# Patient Record
Sex: MALE | Race: BLACK OR AFRICAN AMERICAN | NOT HISPANIC OR LATINO | Employment: OTHER | URBAN - METROPOLITAN AREA
[De-identification: names, ages, dates, MRNs, and addresses within clinical notes are randomized per-mention and may not be internally consistent; named-entity substitution may affect disease eponyms.]

---

## 2017-02-22 ENCOUNTER — TELEPHONE (OUTPATIENT)
Dept: CRITICAL CARE MEDICINE | Facility: HOSPITAL | Age: 62
End: 2017-02-22

## 2017-02-22 NOTE — TELEPHONE ENCOUNTER
Mr. Al notes chest pressure for a few weeks, coming and going. More when lying down, positional. Does not radiate to arm or neck.  More short of breath when lying back in power chair, and with conversation.  No fever or chills.    Assessment:  Progressive orthopnea in the setting of ALS, Mr Al has chronic neuromuscular respiratory failure.  He will benefit from NIV and we will order a trilogy ventilator to supply advanced primary and secondary settings, battery backup and alarms.  BIPAP will not be sufficient respiratory support.  I have advised his daughter Nevaeh to resume cough assist 1-2 times per day and to obtain a pulse oximeter. If chest pressure or shortness of breath worsen acutely, I advised them to go to the local ED.    Bharat Gomez MD

## 2017-02-23 DIAGNOSIS — G12.21 ALS (AMYOTROPHIC LATERAL SCLEROSIS): Primary | ICD-10-CM

## 2017-03-01 ENCOUNTER — OFFICE VISIT (OUTPATIENT)
Dept: NEUROLOGY | Facility: CLINIC | Age: 62
End: 2017-03-01
Payer: MEDICARE

## 2017-03-01 VITALS
HEIGHT: 69 IN | DIASTOLIC BLOOD PRESSURE: 117 MMHG | HEART RATE: 101 BPM | WEIGHT: 154.13 LBS | SYSTOLIC BLOOD PRESSURE: 171 MMHG | BODY MASS INDEX: 22.83 KG/M2

## 2017-03-01 DIAGNOSIS — Z74.09 IMPAIRED MOBILITY AND ADLS: ICD-10-CM

## 2017-03-01 DIAGNOSIS — Z78.9 IMPAIRED MOBILITY AND ADLS: ICD-10-CM

## 2017-03-01 DIAGNOSIS — G89.29 CHRONIC LEFT SHOULDER PAIN: ICD-10-CM

## 2017-03-01 DIAGNOSIS — Z00.8 NUTRITIONAL ASSESSMENT: ICD-10-CM

## 2017-03-01 DIAGNOSIS — R29.898 WEAKNESS OF BOTH UPPER EXTREMITIES: ICD-10-CM

## 2017-03-01 DIAGNOSIS — G12.21 ALS (AMYOTROPHIC LATERAL SCLEROSIS): Primary | ICD-10-CM

## 2017-03-01 DIAGNOSIS — M25.512 CHRONIC LEFT SHOULDER PAIN: ICD-10-CM

## 2017-03-01 PROCEDURE — G8996 SWALLOW CURRENT STATUS: HCPCS | Mod: CH,PO

## 2017-03-01 PROCEDURE — G8999 MOTOR SPEECH CURRENT STATUS: HCPCS | Mod: CI,PO

## 2017-03-01 PROCEDURE — G8980 MOBILITY D/C STATUS: HCPCS | Mod: CL,PO

## 2017-03-01 PROCEDURE — G8988 SELF CARE GOAL STATUS: HCPCS | Mod: CN,PO

## 2017-03-01 PROCEDURE — 92522 EVALUATE SPEECH PRODUCTION: CPT | Mod: PO

## 2017-03-01 PROCEDURE — G9186 MOTOR SPEECH GOAL STATUS: HCPCS | Mod: CI,PO

## 2017-03-01 PROCEDURE — G8997 SWALLOW GOAL STATUS: HCPCS | Mod: CH,PO

## 2017-03-01 PROCEDURE — 92610 EVALUATE SWALLOWING FUNCTION: CPT | Mod: PO

## 2017-03-01 PROCEDURE — 99213 OFFICE O/P EST LOW 20 MIN: CPT

## 2017-03-01 PROCEDURE — G8989 SELF CARE D/C STATUS: HCPCS | Mod: CN,PO

## 2017-03-01 PROCEDURE — G8987 SELF CARE CURRENT STATUS: HCPCS | Mod: CN,PO

## 2017-03-01 PROCEDURE — 97164 PT RE-EVAL EST PLAN CARE: CPT | Mod: PO

## 2017-03-01 PROCEDURE — G8979 MOBILITY GOAL STATUS: HCPCS | Mod: CL,PO

## 2017-03-01 PROCEDURE — G8998 SWALLOW D/C STATUS: HCPCS | Mod: CH,PO

## 2017-03-01 PROCEDURE — G9158 MOTOR SPEECH D/C STATUS: HCPCS | Mod: CI,PO

## 2017-03-01 PROCEDURE — G8978 MOBILITY CURRENT STATUS: HCPCS | Mod: CL,PO

## 2017-03-01 PROCEDURE — 99215 OFFICE O/P EST HI 40 MIN: CPT | Mod: S$PBB,,, | Performed by: PSYCHIATRY & NEUROLOGY

## 2017-03-01 PROCEDURE — 97112 NEUROMUSCULAR REEDUCATION: CPT | Mod: PO

## 2017-03-01 RX ORDER — TRAMADOL HYDROCHLORIDE 50 MG/1
50 TABLET ORAL NIGHTLY
Qty: 30 TABLET | Refills: 0 | Status: SHIPPED | OUTPATIENT
Start: 2017-03-01 | End: 2017-03-11

## 2017-03-01 NOTE — PROGRESS NOTES
"Date: 03/01/2017  Start Time:  9:02 am  Stop Time:  9:23 am    ALS Clinic: 3    OUTPATIENT NEUROLOGICAL REHABILITATION  SPEECH THERAPY EVALUATION    HISTORY AND SUBJECTIVE    Onset Date:  2012  Primary Diagnosis:  ALS  Treatment Diagnosis:  Mild dysarthria and mild dysphagia   Past Medical History:   Past Medical History:   Diagnosis Date    Coronary artery disease     Hypertension      History of Present Illness:  Mr. Al presents to the [facility] clinic secondary to the diagnosis of ALS.   Functional Deficits Leading to Referral/Nature of Injury: Progressive muscle weakness   Precautions:  General swallowing  Prior Therapy:  No speech therapy  Pain:  None reported  Nutrition:  Regular consistency diet and thin liquids  Environmental Concerns/Cultural/Spiritual/Developmental/Educational Needs: None  Social History:  Mr. Al lives in [place], [place] with a friend who is his primary care giver. He is a retired . Mr. Al came to clinic today with his daughter, who lives in [place]. He also has one other adult daughter. He has 24 hour supervision.  Chief Complaint: no speech or swallowing needs at this time; "The disease takes a lot out of you. I don't want to prolong it."     OBJECTIVE  Current Assessment:   Auditory Comprehension: Did not formally assess, no concerns reported or observed.    Reading Comprehension: Did not formally assess today, no concerns were reported.     Verbal Expression: Appeared to be within functional limits in conversational speech during the evaluation.     Written Expression: Did not formally assess. Mr. Al no longer has functional use of his hands, which inhibits his ability to write.     Cognition: Did not formally assess. No concerns were reported.     Motor Speech/Fluency/Voice: Oral mechanism exam revealed: decreased labial muscle strength, decreased lingual muscle strength and range of motion.Lingual tremors were noted. " "Velar elevation was slightly reduced for sustained and alternating elevation. Buccal muscle strength was reduced. Oral motor skills were within functional limits. Mr. Al had full upper and lower dentures. Diadochokinetic (DDK) rates for rapid repetition of 1 - 3 syllable utterances were slowed and mildly imprecise. He reported that he feels like he is running out of air when he speaks. Vocal intensity was reduced as well as labored breathing and speaking. Overall speech intelligibility was good in all contexts most of the time.    Augmentative/Alternative Communication: Not needed at this time. Mr. Al declined banking his voice.     Swallowing: No concerns reported with swallowing when he is sitting upright. Mr. Al reported that he has trouble swallowing when he is reclined. He was advised only to eat and drink when in an upright position. He reported that he does not have difficulty swallowing his medications and that he takes them one at a time. Clinical Swallow Study: Mr. Al was given cracker, water, and pudding. Oral Phase: no anterior loss, good mastication, adequate lip seal around straw and spoon, slight residue after cracker requiring a liquid wash, adequate transfer of bolus. Pharyngeal Phase: sluggish and reduced laryngeal elevation via palpation, audible swallows and delayed cough after straw sips, multiple swallows after josi cracker. After taking 3 sips from a straw, laryngeal elevation was noted only after 2/3 sips. It was recommended to him to have someone pinch the straw while he drinks to calibrate his sips. Vocal quality remained clear. He reported an overall decrease in appetite recently. He does not want a PEG tube. When speaking of the PEG tube, he became emotional and stated "This disease takes a lot out of you, you know?" and "I don't want to be a burden to my family."    Hearing: Appeared to be within functional limits during the evaluation.    Education: Mr. Al and his " daughter were educated on swallowing precautions, compensatory strategies, and the plan of care. He was educated on voice banking, but refused. They verbalized understanding and agreed with the plan of care.    ASSESSMENT  Impressions:  Mr. Al presented with a mild dysarthria and mild dysphagia as a result of his diagnosis of ALS. His deficits are characterized by mild oral motor weakness. However, oral motor skills are functional at this time. Speech was intelligible in all contexts. Mr. Al exhibited mild dysphagia c/b slow laryngeal elevation and audible and inconsistent swallows. A calibrated straw or pinching a straw was recommended to control sip sizes. Cognitive-linguistic, voice, and fluency skills appeared to be within functional limits during the evaluation. During the evaluation he became emotional when asked about a PEG tube. He reported that he does not want to be a burden to his family. Outpatient neuro rehab speech therapy is not warranted at this time.     Functional Communication Measure (FCM):   Severity Modifier for Medicare G-Code:  Motor Speech  Current status: FCM:  Level 6   - CI at least 1% but less than 20% impaired, limited or restricted   Projected status:  FCM:  Level 6    -  CI at least 1% but less than 20% impaired, limited or restricted   Discharge status:  FCM:  Level 6   -  CI at least 1% but less than 20% impaired, limited or restricted     Swallowing  Current status:  FCM:  Level 7   -  CH 0% impaired, limited or restricted  Projected status:  FCM:  Level  7  -  CH 0% impaired, limited or restricted  Discharge status:  FCM:   Level  7  -   CH 0% impaired, limited or restricted    PLAN  Recommended Treatment Plan: Mr. Al will follow up with the [facility] in three months.    ALFREDO King   Clinician     I certify that I was present in the room directing the student in service delivery and guiding them using my  skilled judgment. As the co-signing therapist I have reviewed the students documentation and am responsible for the treatment, assessment, and plan.    GERRY Chambers, CCC-SLP  Speech-Language Pathologist  Outpatient Neurological Rehabilitation      Date: 03/01/2017

## 2017-03-01 NOTE — PROGRESS NOTES
MNT follow-up:    ALS Clinic # 4  Pt & his daughter seen today in conjunction with SLP.    Pt reports his appetite is diminished; is eating 3 times/day, small amounts.  Regular, soft as tolerated diet.  Prefers water to drink; also drinks lemonade and some juice.  Drinks one Ensure Plus daily.  Last night for dinner had broccoli & cheese rice casserole with 1 fried chicken wing.  Water.  Unable to feed himself; requires full assist.  Pt's daughter seems very supportive.    Became tearful when asked about consideration of feeding tube placement, and was adamant he does not want a PEG.     Today's wt of 69.9 kg includes pt + WC; pt's actual weight unable to be determined.  However, this is pt's 4th ALS clinic, and he appears to have lost weight since previous visits.    Will continue to follow according to ALS protocol

## 2017-03-01 NOTE — PROGRESS NOTES
OUTPATIENT NEUROLOGICAL REHABILITATION  PHYSICAL THERAPY EVALUATION    Name: Davey Al  Clinic Number: [id number]    Diagnosis:   Encounter Diagnoses   Name Primary?    Impaired mobility and ADLs Yes    Weakness of both upper extremities     Chronic left shoulder pain     ALS (amyotrophic lateral sclerosis)      Physician: Dr. Joseph  Treatment Orders: PT Eval at ALS clinic  Past Medical History:   Diagnosis Date    Coronary artery disease     Hypertension        Clinic Evaluation Date: 03/01/2017  ALS clinic visit #: 3  Plan of care expiration: Evaluation only.   Precautions: fall risk    Time in: 10:50 AM  Time out: 11:10 AM    History & Subjective     Medical Diagnosis: ALS   PT Diagnosis: UE weakness, impaired ADLs  Chief complaint: no UE function, fast progression of LE atrophy  History of Present Illness: Davey is a 62 y.o. male that presents to [facility] secondary to dx of ALS. Pt was first diagnosed 5 years ago.  Pt is still ambulatory for short household distances with SBA. Pt requires assist with all mobility due to weakness and fall risk.    Current Functional Deficits/Chief Complaint:   1. Difficulty with coughing and increased breathing difficulty   2. B UE weakness  3. increased LE weakness and trunk weakness  4. Pain and discomfort in WC and in bed    Prior Therapy: Outpatient over a year ago - poor results, was not happy with therapy - did not feel the therapists knew about ALS.   Social History: Lives with friend.   Place of Residence (Steps/Adaptations/Levels): 2 steps to enter  Home adaptations: ramp  DME owned: power WC for in home, borrowed manual w/c, adjustable bed, BSC, tub bench  Exercise routine: caregiver PROM by aide  Work/Job description includes:  n/a      Subjective   Pt stated goals/concerns: Discomfort at toes in the bed, increasing difficulty with all mobility.    Family present/states: Adult daughter, Nevaeh present.  Pain: L>R big toe when in  the bed with sheets over 7-8/10 - has found loosening the sheets at the foot of the bed helps.  Sore around chest and back from labored breathing.   Falls: denies falls since last visit.    Objective     Mental status: alert, oriented to person, place, and time  Appearance: Casually dressed  Behavior:  cooperative, tearful at times, depressed   Attention Span and Concentration:  Limited (daughter with good attention)    Posture Alignment: increased ridigity noted in thoracic/ lumbar spine, cervical flexion/ forward head      Sensation: Light Touch: Intact        Proprioception:   Intact    Tone: decreased tone B UEs, trace strength bilaterally; decreased B LEs and trunk    Coordination:   - fine motor: unable  - UE coordination: unable   - LE coordination:  impaired    Lower Extremity ROM WNL for all.     Lower Extremity Strength:   Hip flex R 2/5, trace L  Knee extension: bilaterally 2/5  Knee flex: bilaterally 3/5  DF: right= 1/5, left= 0/5     Evaluation 11/2/16 3/1/17   5 times sit-stand 13 seconds, no hands Unable without assist Unable N/A     Gait Assessment:  No longer ambulatory.     Sitting balance static: fair  Sitting balance dynamic: fair-/poor+  Standing balance static: unable  Standing balance dynamic:  unable    Endurance Deficit: Very limited due to respiratory impairments.     Functional Mobility (Bed mobility, transfers)  Bed mobility: modA  Supine to sit: modA  Sit to supine: modA  Transfers to bed: modA - stand pivot only  Transfers to toilet: modA - stand pivot only  Sit to stand:  modA  Stand pivot:  ModA  Car transfers: ModA  Wheelchair mobility: Louis with power WC - reports some difficulty with chin control controlling the WC ascending ramp at home. No other drive issues.     Education provided re: PROM, pressure relief needs.   Pt's daughter was receptive but pt was minimally receptive to education due to mood/depression.   Pt has no cultural, educational or language barriers to learning  provided.      Assessment   This is a 62 y.o. male referred to outpatient physical therapy and presents with a medical diagnosis of ALS and demonstrates limitations as described in the problem list. Mr. Al has had continued progression of his weakness affecting his trunk and LE stability. He is no longer able to walk even in the home and is WC dependent. He is now requiring more assist from the aides and family members for all transfers and ADLs. He is having discomfort at his feet in the bed for which soft DF boots were suggested but declined. He is also having a lot of discomfort sitting for long periods in all of his chairs. Education completed on frequent pressure relief needed. Pt's daughter was receptive but pt was minimally receptive to education due to mood/depression. He does not warrant any further skilled OP PT at this time.       Functional Limitations Reports - G Codes  Category: Mobility   Tool: functional mobility, transfers, ambualtion  Score: modA   Current: CL at least 60% < 80% impaired, limited or restricted  Goal: CL at least 60% < 80% impaired, limited or restricted  Discharge: CL at least 60% < 80% impaired, limited or restricted      Medical necessity is demonstrated by the following IMPAIRMENTS/PROMBLEM LIST:   1. Fall Risk - impaired balance   2. Weakness B UEs > LEs  3. Impaired muscle tone  4. Gait deviations   5. Decreased community ambulation   6. Decreased activity tolerance   7. Difficulty to participate in daily activities   8. Continued inability to participate in vocational pursuits       Plan   Pt to continue to follow up with referring provider and ALS clinic every 3 months. No outpatient PT warranted.    Luisa Bethea, PT  03/01/2017

## 2017-03-01 NOTE — PROGRESS NOTES
"  Subjective:       Patient ID: Davey Al is a 62 y.o. male.    Chief Complaint:  ALS    History of Present Illness  HPI  Having more difficulty moving and breathing. Had pulse ox study Monday and FVC today is 29%. Is not leaving the house much, and his social interactions have declined. Mood is down.     Discussed EOL issues today and filled out [place] DNR forms. Reviewed and explained all choices to him. Provided opportunity to ask questions. Signed by daughter in presence of patient, with his assent.    Review of Systems  Review of Systems    Objective:   BP (!) 171/117  Pulse 101  Ht 5' 9" (1.753 m)  Wt 69.9 kg (154 lb 1.6 oz)  BMI 22.76 kg/m2     Neurologic Exam    Physical Exam    ALS Physical Exam PBA Speech Facial Strength Tongue Strength Tongue Appear GPS Jaw Jerk   11/2/2016 No normal normal normal fasic No Yes      ALS Physical Exam (Continued) Limb Fasciculations Neck Flex HHD Neck Flex MMT Neck Ext HHD Neck Ext MMT Shd Abd R HHD Shd Abd R MMT   11/2/2016 Absent 16.1 4+ 25.1 5 0 0      ALS Physical Exam (Continued) Shd Abd L HHD Shd Abd L MMT Wrist Ext R HHD Wrist Ext R MMT Wrist Ext L HHD Wrist Ext L MMT Hand  R (kg)   11/2/2016 0 0 0 3;0 0 4;0 0      ALS Physical Exam (Continued) Hand  L (kg) Hip Flex R HHD Hip Flex R MMT Hip Flex L HHD Hip Flex L MMT Knee Ext R HHD Knee Ext R MMT   11/2/2016 0 5.3 3 19.1 4 21.7 4      ALS Physical Exam (Continued) Knee Ext L HHD Knee Ext L MMT Foot DF R HHD Foot DF R MMT Foot DF L HHD Foot DF L MMT UMN Signs Legs   11/2/2016 40 5 0 0 0 0 Yes     FVC today 29%               Impression:        1. ALS (amyotrophic lateral sclerosis)    2. Impaired mobility and ADLs    3. Weakness of both upper extremities    4. Chronic left shoulder pain    5. Nutritional assessment           Recommendations:       - Will order trilogy for FVC of 29%. To be used during the day and evening    - Will consider antidepressant for mood stabilization   - Will scan patient's " [place] advance directive form into the Media tab. For now, he is DNR/DNI   - RTC 3 mos

## 2017-03-01 NOTE — PROGRESS NOTES
"Astrid met with pt and dtr on this date for ALS clinic. Pt is a 61 y/o M who lives in his home with a friend/caregiver. Pt still declines Medicaid waiver because he finds the application process too invasive. Pt and dtr have been looking into buying a van to transport the power wheelchair but have not bought one at this time. Unfortunately, the [employer] rental van does not go far enough to benefit this pt and that service may be eliminated soon due to budget restructuring. SW will look into alternative van rental methods. Pt states he is constantly "uncomfortable" and does not want to prolong this illness any longer. He states he feels like the weight of the allen is on him when he tries to move or stand and does not like living like this. Pt states he has issues sleeping which ASTRID discussed with Dr. GAYTAN. Pt now has power wheelchair and uses it around home and to leave the home occasionally but without van cannot get far. Pt was tearful during visit. SW urged pt to call if he ever needed someone to talk to. Pt declined a referral to mental health professional at this time, but knows if he changes his mind to contact this SWer to help assist. Pt's dtr is interested in caregiver support group and spoke with ALS representative about upcoming dates. ASTRID will continue to provide emotional support to pt and family members and will connect them to community resources PRN.   "

## 2017-03-01 NOTE — PROGRESS NOTES
Patient was seen in ALS clinic today. Patient's overnight SpO2 < 88% was 35.7 minutes. His overnight SpO2 < 89% was 52.4 minutes. This qualifies him for nocturnal oxygen. Additionally, his recorded FVC at today's visit was 29%.     Impression: Mr. Al has progressive orthopnea in the setting of ALS resulting in chronic neuromuscular respiratory failure. He will benefit from NIPPV and we will order a Triology ventilator. The Trilogy ventilator is to supply advanced primary and secondary settings with battery back up and alarms  . Bipap will not be sufficient respiratory support given Mr. Al's current diagnosis and condition.     Raymon Castillo MD

## 2017-03-02 ENCOUNTER — PATIENT MESSAGE (OUTPATIENT)
Dept: NEUROLOGY | Facility: CLINIC | Age: 62
End: 2017-03-02

## 2017-03-02 ENCOUNTER — TELEPHONE (OUTPATIENT)
Dept: NEUROLOGY | Facility: CLINIC | Age: 62
End: 2017-03-02

## 2017-03-02 NOTE — TELEPHONE ENCOUNTER
----- Message from Anabel Regalado sent at 3/2/2017  1:07 PM CST -----  Contact: Nevaeh (daughter) @ [phone]  pls send pts prescription for tramadol (ULTRAM) 50 mg tablet to       [facility] [place], [place] - [id number] [facility] [address]  Phone: [phone] Fax: [phone]

## 2017-03-03 DIAGNOSIS — G12.21 ALS (AMYOTROPHIC LATERAL SCLEROSIS): Primary | ICD-10-CM

## 2017-03-03 DIAGNOSIS — G47.00 INSOMNIA, UNSPECIFIED TYPE: ICD-10-CM

## 2017-03-03 DIAGNOSIS — R52 PAIN: ICD-10-CM

## 2017-03-03 RX ORDER — MELOXICAM 7.5 MG/1
7.5 TABLET ORAL DAILY
Qty: 30 TABLET | Refills: 3 | Status: SHIPPED | OUTPATIENT
Start: 2017-03-03 | End: 2017-07-26

## 2017-03-03 RX ORDER — AMITRIPTYLINE HYDROCHLORIDE 25 MG/1
25 TABLET, FILM COATED ORAL NIGHTLY
Qty: 30 TABLET | Refills: 3 | Status: SHIPPED | OUTPATIENT
Start: 2017-03-03 | End: 2017-03-10 | Stop reason: SDUPTHER

## 2017-03-03 NOTE — PROGRESS NOTES
Spoke with pt daughter. Hesitant to begin Ultram. Will send script for Meloxicam to pharmacy to try first. Also having difficulty sleeping and daytime anxiety. Will send Elavil 25 mg qHS to pharmacy, can increase later if needed.

## 2017-03-07 ENCOUNTER — PATIENT MESSAGE (OUTPATIENT)
Dept: NEUROLOGY | Facility: CLINIC | Age: 62
End: 2017-03-07

## 2017-03-10 ENCOUNTER — TELEPHONE (OUTPATIENT)
Dept: NEUROLOGY | Facility: CLINIC | Age: 62
End: 2017-03-10

## 2017-03-10 DIAGNOSIS — R52 PAIN: ICD-10-CM

## 2017-03-10 DIAGNOSIS — G12.21 ALS (AMYOTROPHIC LATERAL SCLEROSIS): ICD-10-CM

## 2017-03-10 DIAGNOSIS — G47.00 INSOMNIA, UNSPECIFIED TYPE: ICD-10-CM

## 2017-03-10 RX ORDER — AMITRIPTYLINE HYDROCHLORIDE 25 MG/1
50 TABLET, FILM COATED ORAL NIGHTLY
Qty: 60 TABLET | Refills: 3 | Status: SHIPPED | OUTPATIENT
Start: 2017-03-10 | End: 2017-05-12 | Stop reason: SDUPTHER

## 2017-03-10 NOTE — TELEPHONE ENCOUNTER
Spoke with daughter.   Elavil not helping him sleep.     Will increase to 50mg qhs    Reyes Joseph MD, LEO, FAAN  Department of Neurology   [facility] [place], [place]

## 2017-04-11 ENCOUNTER — TELEPHONE (OUTPATIENT)
Dept: NEUROLOGY | Facility: CLINIC | Age: 62
End: 2017-04-11

## 2017-04-11 NOTE — TELEPHONE ENCOUNTER
RN spoke with patient's daughter, Nevaeh, regarding Mr. Al's sleep pattern. Daughter stated Mr. Al is sleeping better, since he has been taking Elavil 50mg with NyQuil PM liquid form. Patient noted to have loss of appetite and difficulty swallowing. Mr. Al eats softer foods and consumes regular liquids. Patient utilizes the trilogy; however, does not feel the cough assist works. RN to contact respiratory vendor. Nevaeh states patient cough is weakening.  Patient c/o constipation tx: OTC stool softener. Nevaeh is requesting another form of tx for constipation.     Nevaeh stated she would like to start the Waiver process. A friend of the family currently helps in the home. RN to inform TESFAYE Boyce, to contact patient.

## 2017-05-10 DIAGNOSIS — G47.00 INSOMNIA, UNSPECIFIED TYPE: ICD-10-CM

## 2017-05-10 DIAGNOSIS — G12.21 ALS (AMYOTROPHIC LATERAL SCLEROSIS): ICD-10-CM

## 2017-05-10 DIAGNOSIS — R52 PAIN: ICD-10-CM

## 2017-05-10 RX ORDER — AMITRIPTYLINE HYDROCHLORIDE 25 MG/1
50 TABLET, FILM COATED ORAL NIGHTLY
Qty: 60 TABLET | Refills: 3 | Status: CANCELLED | OUTPATIENT
Start: 2017-05-10 | End: 2018-05-10

## 2017-05-12 DIAGNOSIS — G12.21 ALS (AMYOTROPHIC LATERAL SCLEROSIS): Primary | ICD-10-CM

## 2017-05-12 DIAGNOSIS — G47.00 INSOMNIA, UNSPECIFIED TYPE: ICD-10-CM

## 2017-05-12 DIAGNOSIS — Z78.9 DECREASED ACTIVITIES OF DAILY LIVING (ADL): ICD-10-CM

## 2017-05-12 DIAGNOSIS — R52 PAIN: ICD-10-CM

## 2017-05-12 DIAGNOSIS — G12.21 ALS (AMYOTROPHIC LATERAL SCLEROSIS): ICD-10-CM

## 2017-05-12 NOTE — TELEPHONE ENCOUNTER
----- Message from Anabel Regalado sent at 5/11/2017  3:43 PM CDT -----  Contact: manuel (daughter) @ [phone]  Calling to req a refill for amitriptyline (ELAVIL) 25 MG tablet.        [id number] - [place], [place] - [address]  [address] [facility] [address]  Phone: [phone] Fax: [phone]

## 2017-05-12 NOTE — TELEPHONE ENCOUNTER
Daughter stated patient experiencing swelling of left foot. He has a history of elevated BP. Nurse informed daughter to set apt with PCP for follow up with BP. She would also like Home Health set up again with PT/OT... Stated patient is becoming stiff in legs and arms. Requesting Tramadol for pain... Stated her father is really uncomfortable and the Mobic is not working.

## 2017-05-16 ENCOUNTER — DOCUMENTATION ONLY (OUTPATIENT)
Dept: NEUROLOGY | Facility: CLINIC | Age: 62
End: 2017-05-16

## 2017-05-16 DIAGNOSIS — R60.0 EDEMA OF LEFT LOWER EXTREMITY: Primary | ICD-10-CM

## 2017-05-16 DIAGNOSIS — R47.1 DYSARTHRIA: Primary | ICD-10-CM

## 2017-05-16 RX ORDER — AMITRIPTYLINE HYDROCHLORIDE 25 MG/1
50 TABLET, FILM COATED ORAL NIGHTLY
Qty: 60 TABLET | Refills: 3 | Status: SHIPPED | OUTPATIENT
Start: 2017-05-16 | End: 2017-07-25 | Stop reason: SDUPTHER

## 2017-05-16 RX ORDER — TRAMADOL HYDROCHLORIDE 50 MG/1
50 TABLET ORAL NIGHTLY
Qty: 30 TABLET | Refills: 1 | Status: SHIPPED | OUTPATIENT
Start: 2017-05-16 | End: 2017-06-15

## 2017-05-16 NOTE — PROGRESS NOTES
Called to speak with patient about foot swelling.     Having minor swelling and pain in left foot, along with pressure at bottom of stomach (rare). Swelling has been going on for about three weeks. Just the foot, not proximal. NO erythema. Relief if foot placed on flat surface (spastic?).     No improvement with elevation.    Will write a script for U/S to be done at [facility].    Reyes Joseph MD, LEO, FAAN  Department of Neurology   [facility] [place], [place]

## 2017-05-17 ENCOUNTER — NURSE TRIAGE (OUTPATIENT)
Dept: ADMINISTRATIVE | Facility: CLINIC | Age: 62
End: 2017-05-17

## 2017-05-17 NOTE — TELEPHONE ENCOUNTER
Reason for Disposition   BP > 160 / 100 and any cardiac or neurologic symptoms (e.g., chest pain, difficulty breathing, unsteady gait, blurred vision)    Protocols used: ST HIGH BLOOD PRESSURE-A-OH    Received phone call from home health nurse.  hospitals she is admitting patient to her service and Blood Pressure is 174/125.  hospitals patient has PCP who has advised ER.  hospitals patient is refusing.  Advised per protocol disposition is ER.  hospitals will inform patient.

## 2017-05-24 ENCOUNTER — TELEPHONE (OUTPATIENT)
Dept: NEUROLOGY | Facility: CLINIC | Age: 62
End: 2017-05-24

## 2017-05-24 NOTE — TELEPHONE ENCOUNTER
Spoke with patient. He had stopped taking his lisinopril at the advice of his physician. After today's readings, took one of his lisinopril and his pressure came down.     Suggested that he keep an eye on his BP and that he may wish to resume taking his medication.     Reyes Joseph MD, LEO, FAAN  Department of Neurology   [facility] [place], [place].

## 2017-05-24 NOTE — TELEPHONE ENCOUNTER
----- Message from Joe Perdomo sent at 5/24/2017 12:11 PM CDT -----  Contact: Home health nurse Alejandra   Pt would like to be called back regarding pt blood pressure being irregularly elevated  190\120. Pt declines ER treatment.       Pt nurse Alejandra can be reached at [phone].

## 2017-05-24 NOTE — TELEPHONE ENCOUNTER
RN spoke with Alejandra and Nevaeh Al.  BP readings: 193/129, 192/124/190/20 HR 90... patient just awaken resting in bed. Denies any symptoms of headache, blurred vision, dizziness, slurred speech or pain. Daughter states she will let her father calm down due to being upset at this time. If patient blood pressure is still elevated after he calms himself, then he will go to urgent care. Patient states ER copay and bills are too expensive.     RN will contact patient's care giver Chalino, [phone] regarding patient's medication prescribed.

## 2017-05-24 NOTE — TELEPHONE ENCOUNTER
----- Message from Joe Perdomo sent at 5/24/2017 12:11 PM CDT -----  Contact: Home health nurse Alejandra   Pt would like to be called back regarding pt blood pressure being irregularly elevated  190\120. Pt declines ER treatment.       Pt nurse Alejandra can be reached at [phone].

## 2017-05-26 ENCOUNTER — TELEPHONE (OUTPATIENT)
Dept: NEUROLOGY | Facility: CLINIC | Age: 62
End: 2017-05-26

## 2017-05-27 NOTE — TELEPHONE ENCOUNTER
Called patient and spoke with Nevaeh Al. They took him to the ED and increased lisinopril and prescribed another med. Has appointment on Thursday with PCP.    He's doing fine. Never was symptomatic from the BP.     Reyes Joseph MD, LEO, FAAN  Department of Neurology   [facility] [place], [place]

## 2017-06-16 ENCOUNTER — TELEPHONE (OUTPATIENT)
Dept: NEUROLOGY | Facility: CLINIC | Age: 62
End: 2017-06-16

## 2017-06-16 ENCOUNTER — TELEPHONE (OUTPATIENT)
Dept: PULMONOLOGY | Facility: CLINIC | Age: 62
End: 2017-06-16

## 2017-06-16 RX ORDER — CETIRIZINE HYDROCHLORIDE, PSEUDOEPHEDRINE HYDROCHLORIDE 5; 120 MG/1; MG/1
1 TABLET, FILM COATED, EXTENDED RELEASE ORAL EVERY 12 HOURS PRN
Qty: 20 TABLET | Refills: 2 | Status: SHIPPED | OUTPATIENT
Start: 2017-06-16 | End: 2017-06-26

## 2017-06-21 ENCOUNTER — TELEPHONE (OUTPATIENT)
Dept: REHABILITATION | Facility: HOSPITAL | Age: 62
End: 2017-06-21

## 2017-06-21 ENCOUNTER — DOCUMENTATION ONLY (OUTPATIENT)
Dept: NUTRITION | Facility: CLINIC | Age: 62
End: 2017-06-21

## 2017-06-21 NOTE — PROGRESS NOTES
MNT:    Attempted to contact pt by phone 2/2 ALS CLinic cancellation today due to weather; left voice mail for return call if pt has any nutrition-related questions/concerns which he would like addressed prior to his next clinic visit.

## 2017-06-28 ENCOUNTER — TELEPHONE (OUTPATIENT)
Dept: NEUROLOGY | Facility: CLINIC | Age: 62
End: 2017-06-28

## 2017-06-28 DIAGNOSIS — G12.21 ALS (AMYOTROPHIC LATERAL SCLEROSIS): Primary | ICD-10-CM

## 2017-07-25 DIAGNOSIS — R52 PAIN: ICD-10-CM

## 2017-07-25 DIAGNOSIS — G12.21 ALS (AMYOTROPHIC LATERAL SCLEROSIS): ICD-10-CM

## 2017-07-25 DIAGNOSIS — G47.00 INSOMNIA, UNSPECIFIED TYPE: ICD-10-CM

## 2017-07-25 RX ORDER — AMITRIPTYLINE HYDROCHLORIDE 25 MG/1
50 TABLET, FILM COATED ORAL NIGHTLY
Qty: 60 TABLET | Refills: 3 | Status: SHIPPED | OUTPATIENT
Start: 2017-07-25 | End: 2017-07-26 | Stop reason: SDUPTHER

## 2017-07-26 ENCOUNTER — OFFICE VISIT (OUTPATIENT)
Dept: NEUROLOGY | Facility: CLINIC | Age: 62
End: 2017-07-26
Payer: MEDICARE

## 2017-07-26 VITALS
HEART RATE: 102 BPM | WEIGHT: 154 LBS | BODY MASS INDEX: 24.75 KG/M2 | HEIGHT: 66 IN | DIASTOLIC BLOOD PRESSURE: 72 MMHG | SYSTOLIC BLOOD PRESSURE: 110 MMHG

## 2017-07-26 DIAGNOSIS — M62.838 MUSCLE SPASMS OF BOTH LOWER EXTREMITIES: ICD-10-CM

## 2017-07-26 DIAGNOSIS — M25.511 CHRONIC PAIN OF BOTH SHOULDERS: ICD-10-CM

## 2017-07-26 DIAGNOSIS — Z78.9 IMPAIRED MOBILITY AND ADLS: ICD-10-CM

## 2017-07-26 DIAGNOSIS — G12.21 ALS (AMYOTROPHIC LATERAL SCLEROSIS): Primary | ICD-10-CM

## 2017-07-26 DIAGNOSIS — G47.00 INSOMNIA, UNSPECIFIED TYPE: ICD-10-CM

## 2017-07-26 DIAGNOSIS — R52 PAIN: ICD-10-CM

## 2017-07-26 DIAGNOSIS — G89.29 CHRONIC PAIN OF BOTH SHOULDERS: ICD-10-CM

## 2017-07-26 DIAGNOSIS — M25.512 CHRONIC PAIN OF BOTH SHOULDERS: ICD-10-CM

## 2017-07-26 DIAGNOSIS — Z74.09 IMPAIRED MOBILITY AND ADLS: ICD-10-CM

## 2017-07-26 PROCEDURE — 99214 OFFICE O/P EST MOD 30 MIN: CPT | Mod: S$PBB,,, | Performed by: PHYSICAL MEDICINE & REHABILITATION

## 2017-07-26 PROCEDURE — 99215 OFFICE O/P EST HI 40 MIN: CPT | Mod: S$PBB,,, | Performed by: PSYCHIATRY & NEUROLOGY

## 2017-07-26 PROCEDURE — 99213 OFFICE O/P EST LOW 20 MIN: CPT | Mod: PBBFAC

## 2017-07-26 PROCEDURE — 99999 PR PBB SHADOW E&M-EST. PATIENT-LVL III: CPT | Mod: PBBFAC,,,

## 2017-07-26 RX ORDER — NAPROXEN SODIUM 220 MG
220-440 TABLET ORAL 2 TIMES DAILY PRN
COMMUNITY
Start: 2017-07-26

## 2017-07-26 RX ORDER — BACLOFEN 10 MG/1
10 TABLET ORAL 3 TIMES DAILY PRN
Qty: 90 TABLET | Refills: 3 | Status: SHIPPED | OUTPATIENT
Start: 2017-07-26 | End: 2017-09-27 | Stop reason: SDUPTHER

## 2017-07-26 RX ORDER — AMLODIPINE BESYLATE 5 MG/1
5 TABLET ORAL DAILY
Refills: 1 | COMMUNITY
Start: 2017-05-25 | End: 2017-08-08 | Stop reason: SDUPTHER

## 2017-07-26 RX ORDER — TRAMADOL HYDROCHLORIDE 50 MG/1
50 TABLET ORAL 3 TIMES DAILY PRN
Qty: 90 TABLET | Refills: 2 | Status: SHIPPED | OUTPATIENT
Start: 2017-07-26 | End: 2017-08-24 | Stop reason: SDUPTHER

## 2017-07-26 RX ORDER — AMITRIPTYLINE HYDROCHLORIDE 25 MG/1
75 TABLET, FILM COATED ORAL NIGHTLY
Qty: 90 TABLET | Refills: 3 | Status: SHIPPED | OUTPATIENT
Start: 2017-07-26 | End: 2018-02-23 | Stop reason: SINTOL

## 2017-07-26 RX ORDER — LISINOPRIL 20 MG/1
20 TABLET ORAL DAILY
Refills: 1 | COMMUNITY
Start: 2017-05-25 | End: 2017-08-08 | Stop reason: SDUPTHER

## 2017-07-26 NOTE — PROGRESS NOTES
"  Subjective:       Patient ID: Davey Al is a 62 y.o. male.    Chief Complaint:  ALS    History of Present Illness  HPI  Davey Al is a 61 yo male with ALS, symptom onset right hand 2011, dx 2012. He notes increased generalized weakness since last visit. Uses power wheelchair for ambulation in the home but they are unable to transport it outside of the home. They states his chair needs some adjustments, [employer] is vendor. Daughter is his main caregiver. He requires assistance with all ADLs. Daughter is physically lifting him for transfers. Receiving home health, [facility] nurse comes to home infrequently. His notes fluctuations in his mood, daughter states he has good and bad days. He denies depression.    Review of Systems  Review of Systems    Objective:   /72   Pulse 102   Ht 5' 6" (1.676 m)   Wt 69.9 kg (154 lb)   BMI 24.86 kg/m²      Neurologic Exam    Physical Exam    ALS Physical Exam PBA Speech Facial Strength Tongue Strength Tongue Appear GPS Jaw Jerk   7/26/2017 No mild mild normal fasic No No      ALS Physical Exam (Continued) Limb Fasciculations Neck Flex HHD Neck Flex MMT Neck Ext HHD Neck Ext MMT Shd Abd R HHD Shd Abd R MMT   7/26/2017 Absent - - - - - -      ALS Physical Exam (Continued) Shd Abd L HHD Shd Abd L MMT Wrist Ext R HHD Wrist Ext R MMT Wrist Ext L HHD Wrist Ext L MMT Hand  R (kg)   7/26/2017 - - - - - - -      ALS Physical Exam (Continued) Hand  L (kg) Hip Flex R HHD Hip Flex R MMT Hip Flex L HHD Hip Flex L MMT Knee Ext R HHD Knee Ext R MMT   7/26/2017 - - - - - - -      ALS Physical Exam (Continued) Knee Ext L HHD Knee Ext L MMT Foot DF R HHD Foot DF R MMT Foot DF L HHD Foot DF L MMT UMN Signs Legs   7/26/2017 - - - - - - No     Pt deferred strength testing today.    ALSFRS Score:  11  FRS-6 Score:  3           Impression:        1. ALS (amyotrophic lateral sclerosis)    2. Impaired mobility and ADLs    3. Chronic pain of both shoulders    4. Muscle spasms of both lower " extremities        Recommendations:       -increase Elavil to 75 mg qHS  -Zo lift  -Needs powerchair adjustments   -pt and daughter agreeable to hospice informational visit   -RTC 3 mo       GILLES VidesC  Department of Neurology   [facility] [place], [place]

## 2017-07-26 NOTE — PROGRESS NOTES
Subjective:       Patient ID: Davey Al is a 62 y.o. male.    Chief Complaint: No chief complaint on file.    HPI     SUBJECTIVE:  Mr. Al is a 62-year-old black male with ALS, who is presenting   to the ALS Clinic for Multidisciplinary Care.  His past medical history is also   significant for CAD, status post MI, currently asymptomatic and for chronic   shoulder pain.  His last visit to the ALS Clinic was on11/02/2016.  He was   maintained on p.r.n. Tylenol and naproxen for his shoulder pain.    The patient is coming today to the clinic for followup.  His upper and lower   extremity weakness has progressed.  He continues to complain of bilateral   shoulder pain, currently worse on the right.  It is an intermittent aching pain.    It is aggravated by staying in one position for too long.  It is better with   passive range of motion.  His maximum pain is 8/10 and minimum 2/10.  Today, it   is 2/10.  The patient also has been complaining of sporadic bilateral foot   cramps.  These are usually painful.  They happen few times per day and can last   few minutes.    He is currently taking naproxen 200 mg p.r.n., usually once or twice per day.    He was taking previously tramadol one tablet at bedtime to help with pain and   sleep, but he has been out of it.      MS/HN  dd: 07/26/2017 09:44:24 (CDT)  td: 07/26/2017 19:30:07 (CDT)  Doc ID   #[id number]  Job ID #[id number]    CC:           Review of Systems   Constitutional: Positive for fatigue.   HENT: Positive for trouble swallowing.    Eyes: Negative for visual disturbance.   Respiratory: Positive for shortness of breath.    Cardiovascular: Negative for chest pain.   Gastrointestinal: Positive for constipation. Negative for blood in stool, nausea and vomiting.   Genitourinary: Negative for difficulty urinating.   Musculoskeletal: Positive for arthralgias, back pain and neck pain. Negative for gait problem.   Neurological: Negative for dizziness and headaches.    Psychiatric/Behavioral: Positive for sleep disturbance. Negative for behavioral problems.       Objective:      Physical Exam   Constitutional: He appears well-developed and well-nourished. No distress.   Coming to the clinic in a manual wheelchair propelled by family   HENT:   Head: Normocephalic and atraumatic.   Neck: Normal range of motion.   -ve pain.  -ve tenderness.   Musculoskeletal:   BUE:  ROM: full passive.  Sev ere muscle atrophy pf shoulders.  Strength:    RUE: 0/5 at shoulder abduction, 0 elbow flexion, 0 elbow extension, 0 hand .   LUE: 0/5 at shoulder abduction, 0 elbow flexion, 0 elbow extension, 0 hand .  Sensation to pinprick:   RUE: intact.   LUE: intact.    BLE:  ROM:full.  Strength:    RLE: 0/5 at hip flexion, 0 knee extension, 0 ankle DF, 0 PF.   LLE: 1/5 at hip flexion, 1 knee extension, 0 ankle DF, 0 PF.  Sensation to pinprick:     RLE: intact.      LLE: intact.        Neurological: He is alert.   Psychiatric: He has a normal mood and affect. His behavior is normal.   Vitals reviewed.      Assessment:       1. ALS (amyotrophic lateral sclerosis)    2. Impaired mobility and ADLs    3. Chronic pain of both shoulders    4. Muscle spasms of both lower extremities        Plan:     - Continue naproxen sodium (ANAPROX) 220 MG tablet; Take 1 tablet (220 mg total) by mouth 2 (two) times daily as needed.  - Start baclofen (LIORESAL) 10 MG tablet; Take 1 tablet (10 mg total) by mouth 3 (three) times daily as needed (muscle spasms).  - Restart tramadol (ULTRAM) 50 mg tablet; Take 1 tablet (50 mg total) by mouth 3 (three) times daily as needed for Pain.  - Passive ROM exercises of BUE, especially shoulders, were recommended.  - Follow up in ALS clinic.

## 2017-07-26 NOTE — PROGRESS NOTES
CHIEF COMPLAINT:  No chief complaint on file.    HISTORY OF PRESENT ILLNESS: Davey Al is a 62 y.o. male  [facility] who presents with his daughter Nevaeh ([place]) for evaluation for ALS.  He has a 25 year history of tobacco smoking (q 50 yo), but was in good health biking 5 miles to work as  and playing golf on weekends. 2011 right hand cramping, three years later left hand involved. Still walking but only with assistance due to flail arm presentation. No orthopnea, but using elevate HOB, sleep is frequently interrupted, no sialorrhea, uses voice but not hands to communicate. Eating without difficulty when head elevated, no changes in speech.  No antibiotics, chest infection or abx since last visit.    Sinus congestion is major concern- on daily nasal steroid, saline, antihistamine (including benadryl at night), decongestant.  Continuous NIV- attributes to sinus trouble.  Uses nasal mask.    PAST MEDICAL HISTORY:    Past Medical History:   Diagnosis Date    Coronary artery disease     Hypertension        PAST SURGICAL HISTORY:    Past Surgical History:   Procedure Laterality Date    CARDIAC CATHETERIZATION      4 stents       FAMILY HISTORY:                No family history on file.  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- assists with eating and ambulation.  ~ 24 hour in house assistance. Nevaeh on weekends, here today  History   Smoking Status    Unknown If Ever Smoked   Smokeless Tobacco    Not on file       ALLERGIES:  No Known Allergies    CURRENT MEDICATIONS:    Current Outpatient Prescriptions   Medication Sig Dispense Refill    amitriptyline (ELAVIL) 25 MG tablet Take 3 tablets (75 mg total) by mouth every evening. 90 tablet 3    amlodipine (NORVASC) 5 MG tablet Take 5 mg by mouth once daily.  1    aspirin (ECOTRIN) 81 MG EC tablet  "Take 81 mg by mouth.      cyanocobalamin, vitamin B-12, 1,000 mcg/mL Kit Inject 1,000 mcg as directed as directed. Inject 1000 mcg twice weekly 8 kit 5    lisinopril (PRINIVIL,ZESTRIL) 20 MG tablet Take 20 mg by mouth once daily.  1    meloxicam (MOBIC) 7.5 MG tablet Take 1 tablet (7.5 mg total) by mouth once daily. 30 tablet 3    rivaroxaban (XARELTO) 10 mg Tab Take 1 tablet (10 mg total) by mouth daily with dinner or evening meal. 30 tablet 11    vitamin E 100 UNIT capsule Take 1,000 Units by mouth once daily.      baclofen (LIORESAL) 10 MG tablet Take 1 tablet (10 mg total) by mouth 3 (three) times daily as needed (muscle spasms). 90 tablet 3    naproxen sodium (ANAPROX) 220 MG tablet Take 1-2 tablets (220-440 mg total) by mouth 2 (two) times daily as needed.      tramadol (ULTRAM) 50 mg tablet Take 1 tablet (50 mg total) by mouth 3 (three) times daily as needed for Pain. 90 tablet 2     No current facility-administered medications for this visit.                   REVIEW OF SYSTEMS:   Pulmonary related symptoms as per HPI.  Gen:  no weight loss, no fever, no night sweats  HEENT: + sinus congestion, + dysphagia positional, no voice changes  CV:  no chest pain, + orthopnea, no paroxysmal nocturnal dyspnea  GI:  no melena, no hematochezia, no diarrhea, no constipation  :  no dysuria, no hematuria, no incontinence  Neuro:  Not ambulatory       PHYSICAL EXAM:   Vitals:    07/26/17 0912   BP: 110/72   Pulse: 102   Weight: 69.9 kg (154 lb)   Height: 5' 6" (1.676 m)   PainSc: 0-No pain   Resp rate 20  GENERAL:  Alert, calm, in no  distress  HEENT:  Normal pupils, normal conjunctiva, normal EOM, nasal and oral mucosa normal, tongue trace fasciculation, not atrophic  NECK:  supple; no palpable lymphadenopathy or masses,no jugular venous distention.  CVS: regular rate and rhythm, no murmers, gallops or rubs  PULM: Grossly decreased vital capacity, normal to percussion and palpation, clear to auscultation " bilaterally with no wheezes, crackles or rhonchi, +SCM accessory muscle usage w VC, ant chest quiet.  ABDOMEN:  soft nontender/nondistended, normal rise with inspiration-seems active, intact contraction with cough  EXTREMITIES no cyanosis, clubbing or edema.  Atrophic flaccid upper extremities    CONTRIBUTORY STUDIES:     PULMONARY FUNCTION TESTS: FVC 10%; cough peak flow 87 l/m, O2 sat       ACTIVE PULMONARY PROBLEMS:    ICD-10-CM ICD-9-CM    1. ALS (amyotrophic lateral sclerosis) G12.21 335.20 amitriptyline (ELAVIL) 25 MG tablet      HME - OTHER   2. Impaired mobility and ADLs Z74.09 799.89 HME - OTHER   3. Chronic pain of both shoulders M25.512 719.41 HME - OTHER    G89.29 338.29     M25.511     4. Muscle spasms of both lower extremities M62.838 728.85 HME - OTHER   5. Pain R52 780.96 amitriptyline (ELAVIL) 25 MG tablet      HME - OTHER   6. Insomnia, unspecified type G47.00 780.52 amitriptyline (ELAVIL) 25 MG tablet      HME - OTHER       ASSESSMENT&PLAN:  1.  Chronic neuromuscular respiratory failure  2.  Cough efficacy diminished- cough assist machine adjusted in clinic.  3.  Does not wish intubation/trach/cpr - recommend hospice at this time  4.  Sinus congestion more problematic with progressive weakness - add 12 hour pseudoephedrine      No Follow-up on file.      [Greater than 50% of this 30 minute visit spent counseling patient and family]

## 2017-07-26 NOTE — PROGRESS NOTES
"SW met with pt and dtr on this date. Pt still lives at home with his lady friend who helps with care giving. He also pays for additional sitters to give her respite (these are private sitters who are not associated with an agency). Pt states he is a happy person and he's not depressed, yet was tearful during our visit and described his daily life "as hellish". Pt spends most of his time watching television and does not leave the house. He describes himself as an introvert and is not interested in going out. SW discussed some things he can do at home- like trying to get outside at least once a day instead of staying in bed in the dark. Pt also states he misses going out to dinner to get dylan crab legs, but has no interest in actually going out. Dtr states she will bring some of his favorite foods home so he can enjoy without having to venture out of the home. Dtr is staying with the pt on the weekends. Pt is not interested in learning more about Radacava because he does not want "to prolong anything". Family is no longer looking to buy a van to help transport the pt in his power wheelchair because he has no interest in leaving his home. SW will remain available to provide emotional support and connect pt and family to pertinent community resources.   "

## 2017-08-08 RX ORDER — AMLODIPINE BESYLATE 5 MG/1
5 TABLET ORAL DAILY
Qty: 30 TABLET | Refills: 3 | Status: SHIPPED | OUTPATIENT
Start: 2017-08-08 | End: 2017-10-02 | Stop reason: SDUPTHER

## 2017-08-08 RX ORDER — LISINOPRIL 20 MG/1
20 TABLET ORAL DAILY
Qty: 90 TABLET | Refills: 3 | Status: SHIPPED | OUTPATIENT
Start: 2017-08-08 | End: 2017-12-04 | Stop reason: SDUPTHER

## 2017-08-24 RX ORDER — TRAMADOL HYDROCHLORIDE 50 MG/1
TABLET ORAL
Qty: 30 TABLET | Refills: 1 | Status: SHIPPED | OUTPATIENT
Start: 2017-08-24 | End: 2017-11-01 | Stop reason: SDUPTHER

## 2017-09-08 DIAGNOSIS — G12.21 ALS (AMYOTROPHIC LATERAL SCLEROSIS): Primary | ICD-10-CM

## 2017-09-08 DIAGNOSIS — Z78.9 IMPAIRED MOBILITY AND ADLS: ICD-10-CM

## 2017-09-08 DIAGNOSIS — Z74.09 IMPAIRED MOBILITY AND ADLS: ICD-10-CM

## 2017-09-27 RX ORDER — BACLOFEN 10 MG/1
10 TABLET ORAL 3 TIMES DAILY PRN
Qty: 90 TABLET | Refills: 3 | Status: SHIPPED | OUTPATIENT
Start: 2017-09-27 | End: 2017-10-27

## 2017-10-02 RX ORDER — AMLODIPINE BESYLATE 5 MG/1
5 TABLET ORAL DAILY
Qty: 30 TABLET | Refills: 3 | Status: SHIPPED | OUTPATIENT
Start: 2017-10-02 | End: 2017-12-04 | Stop reason: SDUPTHER

## 2017-10-25 DIAGNOSIS — G12.21 ALS (AMYOTROPHIC LATERAL SCLEROSIS): ICD-10-CM

## 2017-10-25 DIAGNOSIS — B37.0 THRUSH, ORAL: Primary | ICD-10-CM

## 2017-10-25 RX ORDER — NYSTATIN 100000 [USP'U]/ML
4 SUSPENSION ORAL 4 TIMES DAILY
Qty: 160 ML | Refills: 0 | Status: CANCELLED | OUTPATIENT
Start: 2017-10-25 | End: 2017-11-04

## 2017-10-27 RX ORDER — NYSTATIN 100000 [USP'U]/ML
4 SUSPENSION ORAL 4 TIMES DAILY
Qty: 160 ML | Refills: 0 | Status: SHIPPED | OUTPATIENT
Start: 2017-10-27 | End: 2017-11-06

## 2017-10-30 ENCOUNTER — TELEPHONE (OUTPATIENT)
Dept: NEUROLOGY | Facility: CLINIC | Age: 62
End: 2017-10-30

## 2017-10-31 NOTE — PROGRESS NOTES
Date: 11/01/2017     Start Time:  938  Stop Time:  1000    ALS Clinic: 5    OUTPATIENT NEUROLOGICAL REHABILITATION  SPEECH THERAPY EVALUATION                     ALS MULTIDISCIPLINARY CLINIC    HISTORY AND SUBJECTIVE    Onset Date:  2012  Primary Diagnosis:  ALS  Treatment Diagnosis:  Mild dysarthria and mild dysphagia   Past Medical History:   Past Medical History:   Diagnosis Date    Coronary artery disease     Hypertension      History of Present Illness:  Mr. Al presents to the [facility] clinic secondary to the diagnosis of ALS. Flat affect was observed today. His daughter, Nevaeh, was present during the evaluation.  Functional Deficits Leading to Referral/Nature of Injury: Progressive muscle weakness   Precautions:  General swallowing  Prior Therapy:  No speech therapy  Pain:  None reported  Nutrition: Mechanical soft diet consistency and thin liquids  Environmental Concerns/Cultural/Spiritual/Developmental/Educational Needs: None  Social History:  Mr. Al now lives in [place] with his daughter, Nevaeh, who is his primary caregiver. He is a retired .  He also has one other adult daughter. He has 24 hour supervision.  Chief Complaint: more trouble swallowing    OBJECTIVE  Current Assessment:   Auditory Comprehension: Did not formally assess, no concerns reported or observed.    Reading Comprehension: Did not formally assess today, no concerns were reported.     Verbal Expression: Appeared to be within functional limits in conversational speech during the evaluation.     Written Expression: Did not formally assess. Mr. Al no longer has any movement in his upper extremities.     Cognition: Did not formally assess. No concerns were reported.     Motor Speech/Fluency/Voice: Oral mechanism exam revealed: decreased labial, lingual and velar muscle strength range of motion. Facial and lingual fasiculations were noted. Mr. Al had full upper and lower dentures, however  "they were not secured. He is now using a Trilogy machine with a nasal mask all of the time and is Vent dependent. Vocal intensity was reduced and decreased breath-speech coordination was noted when speaking. Overall speech intelligibility was good in a quiet environment and his daughter reported being able to understand him all of the time. Fluency was within functional limits.     Augmentative/Alternative Communication:  Low-tech communication boards and assisted scanning were discussed and a picture communication board was provided. Speech is still functional and intelligible at this point per patient and family report.     Swallowing: Mr. Al and his daughter reported that he is having more trouble with swallowing. He is now on a soft diet consistency (finely chopped) with thin liquids. Reportedly when a piece of food is too big it sometimes gets stuck, and the other day, he choked on a grape. His ability to chew has declined. He drinks thin liquids from a straw because he is unable to drink unassisted. Mr. Al reported that he is having to use multiple swallows due to an increase in saliva production. He said that his appetite "could be better." He reported that he has stopped taking larger pills. does not have difficulty swallowing his medications and that he takes them one at a time. He does not want a PEG tube.   Clinical Swallow Study:   A clinical swallow study was performed to assess functionality of the pt's swallow. Mr. Al was presented with:  Presentation S/s of aspiration description   Thin liquid via straw  No Reduced elevation, multiple audible swallows   Tsp puree No same   solid No Same, c/o difficulty going down, effortful swallow     Oral Phase: Mr. Al had decreased labial seal around a straw and a spoon. Anterior loss was observed during puree and solid consistencies. Mastication was reduced by observation and patient report. Slight residue after the puree and cracker was present " which was cleared with a liquid wash.  Pharyngeal Phase: Significantly reduced laryngeal elevation and excursion was felt via palpation. Multiple audible swallows were present on all consistencies. Effortful swallow observed for solids. It was recommended to him to have someone pinch the straw while he drinks to calibrate his sips. Vocal quality was inconsistently clear. No coughing or throat clearing were observed during this evaluation. However silent aspiration could not be ruled out and he is at risk for aspiration based on current condition.     Hearing: Appeared to be within functional limits in a quiet environment.    Education: Mr. Al and his daughter were educated on swallowing precautions, diet consistency, compensatory strategies, low-tech communication boards, and the plan of care. They verbalized understanding and agreed with the plan of care.    ASSESSMENT  Impressions:  Mr. Al presented with a mild dysarthria and moderate -severe dysphagia as a result of his diagnosis of ALS. His deficits are characterized by oral motor weakness, decreased vocal intensity and reduced breath-speech coordination. Speech intelligibility was good and his daughter reported that she understands him all of the time in a quiet environment. His dysphagia was characterized by decreased labial seal, reduced mastication, decreased laryngeal elevation, multiple, audible swallows, and inconsistent wet vocal quality. A pureed consistency diet or very finely chopped food diet should be followed. A calibrated straw or pinching a straw was recommended to control sip sizes. A low tech communication board was provided and recommended for use if he is having difficulty speaking. Home health speech therapy may be beneficial to monitor dysphagia and make further recommendations.     Functional Communication Measure (FCM):   Severity Modifier for Medicare G-Code:  Motor Speech  Current status: FCM:  Level 6   - CI at least 1%  but less than 20% impaired, limited or restricted   Projected status:  FCM:  Level 6    -  CI at least 1% but less than 20% impaired, limited or restricted   Discharge status:  FCM:  Level 6   -  CI at least 1% but less than 20% impaired, limited or restricted     Swallowing  Current status:  FCM:  Level 4   - CK  at least 40% < 60% impaired, limited or restricted  Projected status:  FCM: Level 4     - CK at least 40% < 60% impaired, limited or restricted  Discharge status:  FCM:   Level 4   -  CK at least 40% < 60% impaired, limited or restricted      PLAN  Recommended Treatment Plan: Home health ST to monitor dysphagia and make further recommendations. Mr. Al will follow up with the [facility] in three months.    Amelia Reddy, BA/BS  SLP  Clinician    I certify that I was present in the room directing the student in service delivery and guiding them using my skilled judgment. As the co-signing therapist I have reviewed the students documentation and am responsible for the treatment, assessment, and plan.    GERRY Chambers, CCC-SLP  Speech-Language Pathologist  [facility]  Neurological Rehabilitation    Date: 11/01/2017

## 2017-11-01 ENCOUNTER — IMMUNIZATION (OUTPATIENT)
Dept: INTERNAL MEDICINE | Facility: CLINIC | Age: 62
End: 2017-11-01
Payer: MEDICARE

## 2017-11-01 ENCOUNTER — LAB VISIT (OUTPATIENT)
Dept: LAB | Facility: HOSPITAL | Age: 62
End: 2017-11-01
Attending: PSYCHIATRY & NEUROLOGY
Payer: MEDICARE

## 2017-11-01 ENCOUNTER — OFFICE VISIT (OUTPATIENT)
Dept: NEUROLOGY | Facility: CLINIC | Age: 62
End: 2017-11-01
Payer: MEDICARE

## 2017-11-01 VITALS
SYSTOLIC BLOOD PRESSURE: 136 MMHG | HEIGHT: 66 IN | WEIGHT: 115 LBS | DIASTOLIC BLOOD PRESSURE: 100 MMHG | OXYGEN SATURATION: 100 % | HEART RATE: 98 BPM | BODY MASS INDEX: 18.48 KG/M2

## 2017-11-01 DIAGNOSIS — K21.9 GASTROESOPHAGEAL REFLUX DISEASE, ESOPHAGITIS PRESENCE NOT SPECIFIED: ICD-10-CM

## 2017-11-01 DIAGNOSIS — G89.29 CHRONIC PAIN OF BOTH SHOULDERS: ICD-10-CM

## 2017-11-01 DIAGNOSIS — Z78.9 IMPAIRED MOBILITY AND ADLS: ICD-10-CM

## 2017-11-01 DIAGNOSIS — Z74.09 IMPAIRED MOBILITY AND ADLS: ICD-10-CM

## 2017-11-01 DIAGNOSIS — M25.511 CHRONIC PAIN OF BOTH SHOULDERS: ICD-10-CM

## 2017-11-01 DIAGNOSIS — R47.1 DYSARTHRIA: ICD-10-CM

## 2017-11-01 DIAGNOSIS — R13.12 OROPHARYNGEAL DYSPHAGIA: ICD-10-CM

## 2017-11-01 DIAGNOSIS — M25.512 CHRONIC PAIN OF BOTH SHOULDERS: ICD-10-CM

## 2017-11-01 DIAGNOSIS — G12.21 ALS (AMYOTROPHIC LATERAL SCLEROSIS): Primary | ICD-10-CM

## 2017-11-01 DIAGNOSIS — Z00.8 NUTRITIONAL ASSESSMENT: ICD-10-CM

## 2017-11-01 LAB
BASOPHILS # BLD AUTO: 0.04 K/UL
BASOPHILS NFR BLD: 0.4 %
DIFFERENTIAL METHOD: ABNORMAL
EOSINOPHIL # BLD AUTO: 0.1 K/UL
EOSINOPHIL NFR BLD: 1 %
ERYTHROCYTE [DISTWIDTH] IN BLOOD BY AUTOMATED COUNT: 14.6 %
HCT VFR BLD AUTO: 36.2 %
HGB BLD-MCNC: 11.9 G/DL
LYMPHOCYTES # BLD AUTO: 2.9 K/UL
LYMPHOCYTES NFR BLD: 28.4 %
MCH RBC QN AUTO: 29.9 PG
MCHC RBC AUTO-ENTMCNC: 32.9 G/DL
MCV RBC AUTO: 91 FL
MONOCYTES # BLD AUTO: 0.9 K/UL
MONOCYTES NFR BLD: 8.9 %
NEUTROPHILS # BLD AUTO: 6.1 K/UL
NEUTROPHILS NFR BLD: 60.7 %
PLATELET # BLD AUTO: 392 K/UL
PMV BLD AUTO: 8.9 FL
RBC # BLD AUTO: 3.98 M/UL
WBC # BLD AUTO: 10.09 K/UL

## 2017-11-01 PROCEDURE — G8997 SWALLOW GOAL STATUS: HCPCS | Mod: CK,PO

## 2017-11-01 PROCEDURE — G8989 SELF CARE D/C STATUS: HCPCS | Mod: CN,PO

## 2017-11-01 PROCEDURE — 97164 PT RE-EVAL EST PLAN CARE: CPT | Mod: PO

## 2017-11-01 PROCEDURE — G8987 SELF CARE CURRENT STATUS: HCPCS | Mod: CN,PO

## 2017-11-01 PROCEDURE — G8996 SWALLOW CURRENT STATUS: HCPCS | Mod: CK,PO

## 2017-11-01 PROCEDURE — G0008 ADMIN INFLUENZA VIRUS VAC: HCPCS | Mod: PBBFAC

## 2017-11-01 PROCEDURE — G8988 SELF CARE GOAL STATUS: HCPCS | Mod: CN,PO

## 2017-11-01 PROCEDURE — G8978 MOBILITY CURRENT STATUS: HCPCS | Mod: CN,PO

## 2017-11-01 PROCEDURE — G9158 MOTOR SPEECH D/C STATUS: HCPCS | Mod: CI,PO

## 2017-11-01 PROCEDURE — 99999 PR PBB SHADOW E&M-EST. PATIENT-LVL V: CPT | Mod: PBBFAC,,,

## 2017-11-01 PROCEDURE — G9186 MOTOR SPEECH GOAL STATUS: HCPCS | Mod: CI,PO

## 2017-11-01 PROCEDURE — 99213 OFFICE O/P EST LOW 20 MIN: CPT | Mod: S$PBB,,, | Performed by: PHYSICAL MEDICINE & REHABILITATION

## 2017-11-01 PROCEDURE — 92610 EVALUATE SWALLOWING FUNCTION: CPT | Mod: PO

## 2017-11-01 PROCEDURE — G8980 MOBILITY D/C STATUS: HCPCS | Mod: CN,PO

## 2017-11-01 PROCEDURE — G8999 MOTOR SPEECH CURRENT STATUS: HCPCS | Mod: CI,PO

## 2017-11-01 PROCEDURE — 36415 COLL VENOUS BLD VENIPUNCTURE: CPT

## 2017-11-01 PROCEDURE — 92522 EVALUATE SPEECH PRODUCTION: CPT | Mod: PO

## 2017-11-01 PROCEDURE — 99214 OFFICE O/P EST MOD 30 MIN: CPT | Mod: S$PBB,,, | Performed by: INTERNAL MEDICINE

## 2017-11-01 PROCEDURE — 85025 COMPLETE CBC W/AUTO DIFF WBC: CPT

## 2017-11-01 PROCEDURE — G8998 SWALLOW D/C STATUS: HCPCS | Mod: CK,PO

## 2017-11-01 PROCEDURE — 97168 OT RE-EVAL EST PLAN CARE: CPT | Mod: PO

## 2017-11-01 PROCEDURE — 99215 OFFICE O/P EST HI 40 MIN: CPT | Mod: S$PBB,,, | Performed by: PSYCHIATRY & NEUROLOGY

## 2017-11-01 PROCEDURE — 99215 OFFICE O/P EST HI 40 MIN: CPT | Mod: PBBFAC

## 2017-11-01 PROCEDURE — G8979 MOBILITY GOAL STATUS: HCPCS | Mod: CN,PO

## 2017-11-01 RX ORDER — OMEPRAZOLE 20 MG/1
20 CAPSULE, DELAYED RELEASE ORAL DAILY
Qty: 30 CAPSULE | Refills: 11 | Status: SHIPPED | OUTPATIENT
Start: 2017-11-01 | End: 2018-11-01

## 2017-11-01 RX ORDER — TRAMADOL HYDROCHLORIDE 50 MG/1
50 TABLET ORAL NIGHTLY
Qty: 30 TABLET | Refills: 3 | Status: SHIPPED | OUTPATIENT
Start: 2017-11-01 | End: 2017-12-01

## 2017-11-01 NOTE — PROGRESS NOTES
CHIEF COMPLAINT:  Follow-up    HISTORY OF PRESENT ILLNESS: Davey Al is a 62 y.o. male  [place] who presents with his daughter Nevaeh ([place]) for evaluation for ALS.  He has a 25 year history of tobacco smoking (q 50 yo), but was in good health biking 5 miles to work as  and playing golf on weekends. 2011 right hand cramping, three years later left hand involved. Still walking but only with assistance due to flail arm presentation. Using elevate HOB, sleep is frequently interrupted, no sialorrhea, uses voice but not hands to communicate. Eating with some difficulty and does not remove nasal mask (AVAPS), no changes in speech.  No antibiotics, chest infection or abx since last visit.    Sinus congestion is major concern- on daily saline, antihistamine (benadryl at night), decongestant (afrin?).  Continuous NIV.  Uses nasal mask.  Able to remove for a few minutes only.    PAST MEDICAL HISTORY:    Past Medical History:   Diagnosis Date    Coronary artery disease     Hypertension        PAST SURGICAL HISTORY:    Past Surgical History:   Procedure Laterality Date    CARDIAC CATHETERIZATION      4 stents       FAMILY HISTORY:                No family history on file.  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 90 tablet 3    amlodipine (NORVASC) 5 MG tablet Take 1 tablet (5 mg total) by mouth once daily. 30 tablet 3    aspirin (ECOTRIN) 81 MG EC tablet Take 81 mg by mouth.      baclofen (LIORESAL) 10 MG tablet Take 1 tablet (10  "mg total) by mouth 3 (three) times daily as needed (muscle spasms). 90 tablet 3    cyanocobalamin, vitamin B-12, 1,000 mcg/mL Kit Inject 1,000 mcg as directed as directed. Inject 1000 mcg twice weekly 8 kit 5    lisinopril (PRINIVIL,ZESTRIL) 20 MG tablet Take 1 tablet (20 mg total) by mouth once daily. 90 tablet 3    naproxen sodium (ANAPROX) 220 MG tablet Take 1-2 tablets (220-440 mg total) by mouth 2 (two) times daily as needed.      nystatin (MYCOSTATIN) 100,000 unit/mL suspension Take 4 mLs (400,000 Units total) by mouth 4 (four) times daily. 160 mL 0    rivaroxaban (XARELTO) 10 mg Tab Take 1 tablet (10 mg total) by mouth daily with dinner or evening meal. 30 tablet 11    traMADol (ULTRAM) 50 mg tablet Take 1 tablet (50 mg total) by mouth every evening. 30 tablet 3    vitamin E 100 UNIT capsule Take 1,000 Units by mouth once daily.       No current facility-administered medications for this visit.                   REVIEW OF SYSTEMS:   Pulmonary related symptoms as per HPI.  Gen:  no weight loss, no fever, no night sweats  HEENT: + sinus congestion, + dysphagia , no voice changes  CV:  no chest pain, + orthopnea, no paroxysmal nocturnal dyspnea  GI:  no melena, no hematochezia, no diarrhea, no constipation  :  no dysuria, no hematuria, no incontinence  Neuro:  Not ambulatory       PHYSICAL EXAM:   Vitals:    11/01/17 0900   BP: (!) 136/100   Pulse: 98   Weight: 52.2 kg (115 lb)   Height: 5' 6" (1.676 m)   PainSc: 0-No pain   Resp rate 20  GENERAL:  Alert, calm, in no  distress  HEENT:  Normal pupils, normal conjunctiva, normal EOM, nasal and oral mucosa normal, tongue trace fasciculation, not atrophic  NECK:  supple; no palpable lymphadenopathy or masses,no jugular venous distention.  CVS: regular rate and rhythm, no murmers, gallops or rubs  PULM: Grossly decreased vital capacity, normal to percussion and palpation, clear to auscultation bilaterally with no wheezes, crackles or rhonchi, +SCM accessory " muscle usage w VC, ant chest quiet.  ABDOMEN:  soft nontender/nondistended, normal rise with inspiration-seems active, intact contraction with cough  EXTREMITIES no cyanosis, clubbing or edema.  Atrophic flaccid upper extremities    CONTRIBUTORY STUDIES:     PULMONARY FUNCTION TESTS: FVC 10%; cough peak flow 87 l/m, O2 sat       ACTIVE PULMONARY PROBLEMS:    ICD-10-CM ICD-9-CM    1. ALS (amyotrophic lateral sclerosis) G12.21 335.20    2. Impaired mobility and ADLs Z74.09 799.89    3. Chronic pain of both shoulders M25.511 719.41 traMADol (ULTRAM) 50 mg tablet    G89.29 338.29     M25.512         ASSESSMENT&PLAN:  1.  Chronic neuromuscular respiratory failure- on continuous NIV (AVAPS PS 10-25)  EPAP 4  2.  Cough efficacy diminished- cough assist available, not using consistently.  Uses suction machine PRN  3.  Does not wish intubation/trach/cpr- completed [place] - accepts Guardian Vladimir information visit at this time  4.  Sinus congestion - cont rx, add flonase  5.  Dysphagia- had declined gastrostomy tube- reconsidering now.  Advised that risk is increased, but likely possible by IR.      No Follow-up on file.      [Greater than 50% of this 40 minute visit spent counseling patient and family]

## 2017-11-01 NOTE — PROGRESS NOTES
OUTPATIENT NEUROLOGICAL REHABILITATION  PHYSICAL THERAPY RE-EVALUATION    Name: Davey Al  Clinic Number: [id number]    Diagnosis:   Encounter Diagnoses   Name Primary?    ALS (amyotrophic lateral sclerosis) Yes    Impaired mobility and ADLs     Chronic pain of both shoulders     Gastroesophageal reflux disease, esophagitis presence not specified     Dysarthria     Oropharyngeal dysphagia      Physician: Dr. Joseph  Treatment Orders: PT Eval at ALS clinic  Past Medical History:   Diagnosis Date    Coronary artery disease     Hypertension        Clinic Evaluation Date: 11/01/2017  ALS clinic visit #: 5  Plan of care expiration: Evaluation only.   Precautions: fall risk    Time in: 10:30AM  Time out: 10:42 AM    History & Subjective     Medical Diagnosis: ALS   PT Diagnosis: Generalized weakness, impaired functional mobility and ADLs  Chief complaint: Awaiting PWC adjustments. Pt unable to operate it with current chin controls.   History of Present Illness: Davey is a 62 y.o. male that presents to [facility] secondary to dx of ALS. Pt was first diagnosed 5 years ago.  Pt is no longer ambulatory. Pt recently moved from [place] to [place] to live with his daughter. Pt's PWC is still in [place], and his daughter is working on getting it here.      Current Functional Deficits/Chief Complaint:   1. Awaiting adjustments and new cushion for PWC and getting PWC to [facility] from [facility] made aware and will follow up with pt and daughter  2. Non-ambulatory  3. increased LE weakness- pt is no longer ambulatory  4. Occasional pain in R shoulder  5. History of skin breakdown on his back and his buttocks (family recently purchased a new pressure-relieving mattress and new cushion for the W/C)  6. Unable to use chin controls on PWC    Prior Therapy: Home PT in [place] recently, but that stopped a few weeks ago  Social History: Lives with his daughter, has caregiver 8 hours/day, 5  days/week   Place of Residence (Steps/Adaptations/Levels): no stairs to enter  Home adaptations: ramp  DME owned: power WC for in home, borrowed manual w/c, adjustable bed, BSC, tub bench  Exercise routine: caregiver PROM by aide and family, but daughter reports new aides aren't assisting with positional changes and ROM as much  Work/Job description includes:  n/a      Subjective   Pt stated goals/concerns: Getting adjustments to PWC and getting the PWC to [place].    Family present/states: Adult daughter, Nevaeh present.  Pain: R shoulder pain  Falls: denies falls since last visit.    Objective     Mental status: alert, oriented to person, place, and time  Appearance: Casually dressed  Behavior:  cooperative, flat affect   Attention Span and Concentration:  Limited (daughter with good attention- she is tearful today)    Posture Alignment: increased ridigity noted in thoracic/ lumbar spine, decreased head control. Pt wearing Aspen collar today, but his daughter manually supports his head at times, pt tends towards cervical extension      Sensation: Light Touch: Intact- pt reports occasional numbness/tingling in arms       Proprioception:   Intact    Tone: decreased tone B Ues, neck, trunk and in B LEs    Coordination:   - fine motor: unable  - UE coordination: unable   - LE coordination:  unbalt    Lower Extremity ROM WNL for all.     Lower Extremity Strength:   0/5 throughout RLE  0/5 L hip flexion and trace LE strength through remainder of LLE       Evaluation 11/2/16 3/1/17 11/1/17   5 times sit-stand 13 seconds, no hands Unable without assist Unable N/A Unable/ NA     Gait Assessment:  No longer ambulatory.     Sitting balance static: poor  Sitting balance dynamic: poor  Standing balance static: unable  Standing balance dynamic:  unable    Endurance Deficit: Very limited due to respiratory impairments.     Functional Mobility (Bed mobility, transfers)  Bed mobility: Dep  Supine to sit: Dep  Sit to supine:  Dep  Transfers to bed: Dep  Transfers to toilet: no longer going to the bathroom for toileting (diapers)  Sit to stand:  Dep  Stand pivot:  Dep  Car transfers: Dependent  Wheelchair mobility: Dep- Min A. Pt with increased difficulty using chin controls. Pt's daughter interested in caregiver drive control.     Education provided re: PROM, pressure relief needs, benefit of maintenance Home Pt, benefit of hospital bed.   Pt's daughter was receptive but a bit resistant to use of hospital bed.   Pt has no cultural, educational or language barriers to learning provided.      Assessment   This is a 62 y.o. male referred to outpatient physical therapy and presents with a medical diagnosis of ALS and demonstrates limitations as described in the problem list. Mr. Al has had continued progression of his weakness affecting his trunk and LE strength. Pt also with poor head control and daughter reports increasing difficulty for him using the chin control for PWC. Pt is nonambulatory and dependent for all transfers and care. Daughter reports he has had skin breakdown on his back and buttocks recently.  Pt has caregivers x 8 hours, 5 days/week.  At this time recommending maintenance Home PT for education of caregivers to perform ROM/stretching routine, as well as for positional changes to promote skin integrity. Also recommending a pressure relieving hospital bed at this time to promote skin integrity, ease positional changes for and care provided by family and caregivers. Pt's daughter is receptive and open to recommendations but a bit resistant to the hospital bed. Pt is open to the hospital bed. Pt does not warrant any further skilled OP PT at this time.       Functional Limitations Reports - G Codes  Category: Mobility   Tool: functional mobility, transfers, ambualtion  Score: Dependent   Current: % impaired, limited or restricted  Goal: % impaired, limited or restricted  Discharge: % impaired, limited or  restricted      Medical necessity is demonstrated by the following IMPAIRMENTS/PROMBLEM LIST:   1. Fall Risk - impaired balance   2. Weakness B UEs > LEs  3. Impaired muscle tone  4. Gait deviations   5. Decreased community ambulation   6. Decreased activity tolerance   7. Difficulty to participate in daily activities   8. Continued inability to participate in vocational pursuits       Plan   Pt to continue to follow up with referring provider and ALS clinic every 3 months. No outpatient PT warranted.    Fannie Bush, PT  11/01/2017

## 2017-11-01 NOTE — PROGRESS NOTES
"Referring Physician:No ref. provider found     Reason for visit:  Chief Complaint   Patient presents with    Follow-up    SLP Initial Evaluation     re-eval    PT Initial Evaluation     re-eval    OT Progress Note     re-eval    Nutrition Counseling     initial        :1955     Allergies Reviewed  Meds Reviewed    Anthropometrics  Weight:52.2 kg (115 lb)  Height:5' 6" (1.676 m)  BMI:Body mass index is 18.56 kg/m².   IBW:   64.5 kg    Meds:  Outpatient Medications Prior to Visit   Medication Sig Dispense Refill    amitriptyline (ELAVIL) 25 MG tablet Take 3 tablets (75 mg total) by mouth every evening. 90 tablet 3    amlodipine (NORVASC) 5 MG tablet Take 1 tablet (5 mg total) by mouth once daily. 30 tablet 3    aspirin (ECOTRIN) 81 MG EC tablet Take 81 mg by mouth.      baclofen (LIORESAL) 10 MG tablet Take 1 tablet (10 mg total) by mouth 3 (three) times daily as needed (muscle spasms). 90 tablet 3    cyanocobalamin, vitamin B-12, 1,000 mcg/mL Kit Inject 1,000 mcg as directed as directed. Inject 1000 mcg twice weekly 8 kit 5    lisinopril (PRINIVIL,ZESTRIL) 20 MG tablet Take 1 tablet (20 mg total) by mouth once daily. 90 tablet 3    naproxen sodium (ANAPROX) 220 MG tablet Take 1-2 tablets (220-440 mg total) by mouth 2 (two) times daily as needed.      nystatin (MYCOSTATIN) 100,000 unit/mL suspension Take 4 mLs (400,000 Units total) by mouth 4 (four) times daily. 160 mL 0    vitamin E 100 UNIT capsule Take 1,000 Units by mouth once daily.      rivaroxaban (XARELTO) 10 mg Tab Take 1 tablet (10 mg total) by mouth daily with dinner or evening meal. 30 tablet 11    tramadol (ULTRAM) 50 mg tablet TAKE 1 TABLET BY MOUTH AT BEDTIME 30 tablet 1     No facility-administered medications prior to visit.          Labs:   N/A     Estimated Nutrition Needs: 1935 Kcals/day( 30 kcal/kg IBW),    52 g protein( 1.0 g/kg)     Diet Hx:   Pt & his daughter Nevaeh present for initial nutrition assessment in ALS Clinic " (note pt declined nutrition services at 7/26/17 clinic visit).  Pt in PWC with Trilogy vent.  Daughter states pt is having difficulty swallowing; she is finely chopping his foods and providing very soft foods.  Pt requires feeding.  Per daughter, pt's po intake is declining.  Pt is having stomach pain/nausea, which is affecting his po intake.     Dinner:   Last night, from [place]:  Baked chicken (finely chopped), cheese mashed potatoes, carrot souffle.    Assessment:   Apparently pt/daughter are considering G-tube placement; will notify clinic coordinator of their decision.    Nutrition Diagnosis:   None at this time.    Recommendations:   Soft, liquid diet as tolerated.  Handouts provided & reviewed:  High-Calorie, High-Protein Nutrition Therapy; Food Choices to Add Calories and Protein; Suggestions for Increasing Calories and Protein; High-Calorie, High-Protein Recipes; High-Calorie, High-Protein Shopping & Cooking Tips; Preventing and Dealing with Constipation         Consultation Time:15 minutes.    Follow Up:  Pt's daughter provided with dietitian contact number and advised to call with questions or make future appointment if further intervention needed prior to next scheduled ALS clinic.

## 2017-11-01 NOTE — PROGRESS NOTES
Subjective:       Patient ID: Davey Al is a 62 y.o. male.    Chief Complaint: Follow-up    HPI     HISTORY OF PRESENT ILLNESS:  Mr. Al is a 62-year-old black male with ALS who   is presenting to the ALS Clinic for multidisciplinary care.  His past medical   history is also significant for CAD status post MI, currently asymptomatic and   for chronic bilateral shoulder pain.  He is seen by the Physical Medicine   Service for his pain management.  His last visit was on 07/26/2017.  He was   maintained on baclofen, p.r.n. naproxen and p.r.n. tramadol.    The patient is coming today to the clinic for followup, accompanied by his   daughter.  His bilateral shoulder pain has been stable.  It is an intermittent   aching and soreness.  It is aggravated by staying in one position for too long.    It is better with gentle range of motion.  His maximum pain is 8/10 and minimum   0/10.  Today, it is 0/10.    The patient was taking naproxen, but stopped it recently secondary to GI bleed.    He attributes it to taking over-the-counter Excedrin along with his other   NSAIDs.  He continues to take tramadol 50 mg p.r.n., usually once per day.      MS/HN  dd: 11/01/2017 09:30:42 (CDT)  td: 11/02/2017 06:28:27 (CDT)  Doc ID   #[id number]  Job ID #[id number]    CC:           Review of Systems   HENT: Positive for trouble swallowing.    Eyes: Negative for visual disturbance.   Respiratory: Positive for shortness of breath.    Cardiovascular: Negative for chest pain.   Gastrointestinal: Positive for abdominal pain and constipation. Negative for blood in stool, nausea and vomiting.   Genitourinary: Positive for difficulty urinating.   Musculoskeletal: Positive for arthralgias, back pain and neck pain. Negative for gait problem.   Neurological: Positive for weakness. Negative for dizziness and headaches.   Psychiatric/Behavioral: Positive for sleep disturbance. Negative for behavioral problems.       Objective:      Physical Exam    Constitutional: He appears well-developed and well-nourished. No distress.   Coming to the clinic in a manual wheelchair propelled by family   HENT:   Head: Normocephalic and atraumatic.   Neck: Normal range of motion.   -ve pain.  -ve tenderness.   Musculoskeletal:   BUE:  Severe muscle atrophy of shoulders.  Strength:    RUE: 0/5 at shoulder abduction, 0 elbow flexion, 0 elbow extension, 0 hand .   LUE: 0/5 at shoulder abduction, 0 elbow flexion, 0 elbow extension, 0 hand .  Sensation to pinprick:   RUE: intact.   LUE: intact.    BLE:  ROM:full.  Strength:    RLE: 0/5 at hip flexion, 0 knee extension, 0 ankle DF, 0 PF.   LLE: 0/5 at hip flexion, 0 knee extension, 0 ankle DF, 0 PF.  Sensation to pinprick:     RLE: intact.      LLE: intact.        Neurological: He is alert.   Psychiatric: He has a normal mood and affect. His behavior is normal.   Vitals reviewed.      Assessment:       1. ALS (amyotrophic lateral sclerosis)    2. Impaired mobility and ADLs    3. Chronic pain of both shoulders        Plan:     - Discontinue naproxen sodium (due to history of recent GI bleed)..  - Continue baclofen (LIORESAL) 10 MG tablet; Take 1 tablet (10 mg total) by mouth 3 (three) times daily as needed (muscle spasms).  - Continue tramadol (ULTRAM) 50 mg tablet; Take 1 tablet (50 mg total) by mouth 3 (three) times daily as needed for Pain.  - Passive ROM exercises of BUE, especially shoulders, were recommended.  - Follow up in ALS clinic.

## 2017-11-01 NOTE — PROGRESS NOTES
Name: Davey Al  MRN: [id number]   Physician: Jake River. Not seen last clinic.  Diagnosis:   Encounter Diagnoses   Name Primary?    ALS (amyotrophic lateral sclerosis) Yes    Impaired mobility and ADLs     Chronic pain of both shoulders     Gastroesophageal reflux disease, esophagitis presence not specified     Dysarthria     Oropharyngeal dysphagia     Clinic 5    Onset date: 5 years    Orders: Eval and Treat    Subjective:  Patient goals: Comfort in power w/c, daughter able to drive power w/c for patient when he is too tired.   Chief Complaint:   Chief Complaint   Patient presents with    Follow-up    SLP Initial Evaluation     re-eval    PT Initial Evaluation     re-eval    OT Progress Note     re-eval      Pt. Recently moved from his home is [place] to daughter's home in [place]. Has a paid caregiver 40 hours a week. [employer] motion rep contacted to help them move power w/c to [place] and to place caregiver power drive on chair.     DME: PWC with chin control which he can no longer control and needs head array. Also in need of roho for pressure relief. [employer] informed,  B hand splints, BSC, Tub bench. Currently in regular bed at daughter's home. Recommended [place] Rom bed for ease of care for this D gentleman.     Home access: flat entrance to r. Home.  Past treatment includes:none recently    Precautions:fall,   Pain:none stated when asked but then later stated occasional R shoulder pain.  Pain established using the Numbers pain scale.     Dominant hand: right    Occupation/hobbies/homemaking: D, in bed a lot,   Job description includes: na  Driving status: na    Objective  Cognitive Exam  Oriented: Person, Place, Time and Situation  Behaviors: tearful  Follows Commands/attention: Follows multistep  commands  Communication: clear/fluent  Memory: No Deficits noted  Safety awareness/insight to disability: fair,   Coping skills/emotional control: Tearful      Physical  Exam:    Postural examination/scapula alignment: rounded B   Joint integrity:  subluxed shoulders x 2 fingers.  Skin integrity: daughter reported red areas but did not get specific. No hospitalizations or other medical tx known of.   Edema: none    Palpation: no palpable tenderness    Sensation: Davey reports normal sensation.  No AROM in BUE  PROM tight in B shoulders and wrists.     Tone:  Modified Ryan Scale:   0 - No increase in muscle tone      Balance:   Static Sit Poor  Dynamic sit Poor    Functional Status:                            Functional Mobility:  Bed mobility: D  Roll to left: D  Roll to right: D  Supine to sit: D  Sit to supine: D  Transfers to bed: D  Transfers to toilet: D  Car transfers: D  Wheelchair mobility: needs headarray and caregiver assist to drive wheelchair.     ADL's:  Feeding: D  Grooming: D  Hygiene: D  UB Dressing: D  LB Dressing: D  Toileting: D  Bathing: D    IADL's:  Homecare: D  Cooking: D  Laundry: D  Yard work: D  Use of telephone: mod a  Money management: D  Medication management: D      TODAY'S TREATMENT  Re-eval for needs. Reviewed importance of position change frequently in PWC ,lift chair and bed.     ASSESSMENT:  OT diagnosis: Impaired mobility and ADL, subluxed shoulders with pain.     Assessment  Davey Al is a 62 y.o. male with a medical diagnosis of   Encounter Diagnoses   Name Primary?    ALS (amyotrophic lateral sclerosis) Yes    Impaired mobility and ADLs     Chronic pain of both shoulders     Gastroesophageal reflux disease, esophagitis presence not specified     Dysarthria     Oropharyngeal dysphagia     referred to occupational therapy for eval and tx.       PLAN: OT HH to teach caregivers ROM and positioning as well as maintenance. Recommend Hospital bed with air mattress for ease of care and skin protection.

## 2017-11-01 NOTE — PROGRESS NOTES
"  Subjective:       Patient ID: Davey Al is a 62 y.o. male.    Chief Complaint:  ALS      History of Present Illness  HPI  Stopped the blood thinner. Prefers not to be on medications.  Flu vaccine - will do today  Thrush - doing better after nystatin, but having a hard time swish/spit  Hospice informational referral at last visit.   Elavil for mood  Zo lift at last visit  Has had stomach pain and dark stools (one). Query ulcer.     Review of Systems  Review of Systems    Objective:      Neurologic Exam    Physical Exam  BP (!) 136/100   Pulse 98   Ht 5' 6" (1.676 m)   Wt 52.2 kg (115 lb)   SpO2 100%   BMI 18.56 kg/m²     ALS Physical Exam PBA Speech Facial Strength Tongue Strength Tongue Appear GPS Jaw Jerk   7/26/2017 No mild mild normal fasic No No      ALS Physical Exam (Continued) Limb Fasciculations Neck Flex HHD Neck Flex MMT Neck Ext HHD Neck Ext MMT Shd Abd R HHD Shd Abd R MMT   7/26/2017 Absent - - - - - -      ALS Physical Exam (Continued) Shd Abd L HHD Shd Abd L MMT Wrist Ext R HHD Wrist Ext R MMT Wrist Ext L HHD Wrist Ext L MMT Hand  R (kg)   7/26/2017 - - - - - - -      ALS Physical Exam (Continued) Hand  L (kg) Hip Flex R HHD Hip Flex R MMT Hip Flex L HHD Hip Flex L MMT Knee Ext R HHD Knee Ext R MMT   7/26/2017 - - - - - - -      ALS Physical Exam (Continued) Knee Ext L HHD Knee Ext L MMT Foot DF R HHD Foot DF R MMT Foot DF L HHD Foot DF L MMT UMN Signs Legs   7/26/2017 - - - - - - No                    Impression:        1. ALS (amyotrophic lateral sclerosis)    2. Impaired mobility and ADLs    3. Chronic pain of both shoulders    4. Gastroesophageal reflux disease, esophagitis presence not specified    5. Dysarthria    6. Oropharyngeal dysphagia    7. Nutritional assessment           Recommendations:       - Flu vaccine today  - Will to Primary Care today for provider for ongoing care.   - Try using sponge tipped applicators for nystatin care  - Stopped xeralto. Will monitor.   - " GERD sx. Will start PPI     - Zyrtec for PND  - Glycopyrrolate for oral secretions    Reyes Joseph MD, LEO, FAAN  Department of Neurology   [facility] [place], [place]

## 2017-11-02 ENCOUNTER — DOCUMENTATION ONLY (OUTPATIENT)
Dept: NEUROLOGY | Facility: CLINIC | Age: 62
End: 2017-11-02

## 2017-11-02 RX ORDER — CETIRIZINE HYDROCHLORIDE 1 MG/ML
10 SOLUTION ORAL DAILY
Qty: 300 ML | Refills: 11 | Status: SHIPPED | OUTPATIENT
Start: 2017-11-02 | End: 2018-11-02

## 2017-11-02 RX ORDER — GLYCOPYRROLATE 1 MG/1
1 TABLET ORAL 3 TIMES DAILY
Qty: 270 TABLET | Refills: 3 | Status: SHIPPED | OUTPATIENT
Start: 2017-11-02

## 2017-11-02 NOTE — PROGRESS NOTES
TESFAYE followed up with daughter regarding the community choice waiver. TESFAYE provided the line to call to initiate the process and explained the process. TESFAYE has called [facility] [place] to inquire about pt's spot on the waitlist and remove him from that list so he can pursue the waiver in [place] now that he has relocated. TESFAYE spoke with Alejandra at [facility] in [facility] who will call TESFAYE back to discuss.

## 2017-11-09 ENCOUNTER — TELEPHONE (OUTPATIENT)
Dept: NEUROLOGY | Facility: CLINIC | Age: 62
End: 2017-11-09

## 2017-11-14 ENCOUNTER — TELEPHONE (OUTPATIENT)
Dept: INTERNAL MEDICINE | Facility: CLINIC | Age: 62
End: 2017-11-14

## 2017-11-14 NOTE — TELEPHONE ENCOUNTER
Sangita,  Can you have someone call and schedule an appointment---Dr. Rivas is currently not accepting new patients---thanks

## 2017-11-14 NOTE — TELEPHONE ENCOUNTER
----- Message from Migdalia Razo sent at 11/13/2017  5:14 PM CST -----  Contact: Pt's daughter Nevaeh Herring is calling in regards to scheduling an appt for pt. The books aren't currently open to schedule this appt.    Nevaeh would like a call back to schedule an appt at [phone].    Thank you

## 2017-12-04 RX ORDER — LISINOPRIL 20 MG/1
TABLET ORAL
Qty: 90 TABLET | Refills: 3 | Status: SHIPPED | OUTPATIENT
Start: 2017-12-04

## 2017-12-04 RX ORDER — AMLODIPINE BESYLATE 5 MG/1
TABLET ORAL
Qty: 30 TABLET | Refills: 0 | Status: SHIPPED | OUTPATIENT
Start: 2017-12-04 | End: 2018-01-01 | Stop reason: SDUPTHER

## 2018-01-01 RX ORDER — AMLODIPINE BESYLATE 5 MG/1
TABLET ORAL
Qty: 90 TABLET | Refills: 3 | Status: SHIPPED | OUTPATIENT
Start: 2018-01-01

## 2018-01-11 ENCOUNTER — TELEPHONE (OUTPATIENT)
Dept: NEUROLOGY | Facility: CLINIC | Age: 63
End: 2018-01-11

## 2018-01-11 DIAGNOSIS — R41.0 CONFUSION AND DISORIENTATION: Primary | ICD-10-CM

## 2018-01-11 NOTE — TELEPHONE ENCOUNTER
Called and spoke with daughter. He's been hallucinating for some time, but seems to be getting worse. Has noticed some cloudiness to urine.     Plan  1. Decrease elavil from 75mg/night to 25mg for next two nights, then off.   2. Will order labs: UA, CBC, chem panel  3. Urgent care visit tomorrow.  4. Follow up on results

## 2018-01-11 NOTE — TELEPHONE ENCOUNTER
[1/11/2018 4:15 PM] Nevaeh Al:     Dewey Garcia.  I wanted to see if Dr. GAYTAN was available to talk to me about Dad.  He's been having hallucinations, and they seem to be getting worse.  I want to find out if this is part of ALS.    He hasn't started anything new.  He wakes up and swears there is a spider running around the room, or bugs on the wall, and people in the corners.  He has always talked a little in his sleep, but it's gotten worse.  He sometimes can't tell the difference between dreams and reality for the first 10 minutes after he wakes up.      Nevaeh Al  Cell# [phone]

## 2018-01-13 ENCOUNTER — OFFICE VISIT (OUTPATIENT)
Dept: URGENT CARE | Facility: CLINIC | Age: 63
End: 2018-01-13
Payer: MEDICARE

## 2018-01-13 VITALS
DIASTOLIC BLOOD PRESSURE: 78 MMHG | SYSTOLIC BLOOD PRESSURE: 104 MMHG | RESPIRATION RATE: 18 BRPM | WEIGHT: 115 LBS | BODY MASS INDEX: 18.48 KG/M2 | TEMPERATURE: 100 F | HEIGHT: 66 IN | HEART RATE: 74 BPM | OXYGEN SATURATION: 98 %

## 2018-01-13 DIAGNOSIS — B37.0 THRUSH, ORAL: ICD-10-CM

## 2018-01-13 DIAGNOSIS — R44.1 VISUAL HALLUCINATIONS: Primary | ICD-10-CM

## 2018-01-13 DIAGNOSIS — J30.9 ALLERGIC RHINITIS, UNSPECIFIED CHRONICITY, UNSPECIFIED SEASONALITY, UNSPECIFIED TRIGGER: ICD-10-CM

## 2018-01-13 LAB
BILIRUB UR QL STRIP: NEGATIVE
GLUCOSE UR QL STRIP: NEGATIVE
KETONES UR QL STRIP: NEGATIVE
LEUKOCYTE ESTERASE UR QL STRIP: NEGATIVE
PH, POC UA: 6.5 (ref 5–8)
POC BLOOD, URINE: NEGATIVE
POC NITRATES, URINE: NEGATIVE
PROT UR QL STRIP: NEGATIVE
SP GR UR STRIP: 1.01 (ref 1–1.03)
UROBILINOGEN UR STRIP-ACNC: NORMAL (ref 0.3–2.2)

## 2018-01-13 PROCEDURE — 99204 OFFICE O/P NEW MOD 45 MIN: CPT | Mod: 25,S$GLB,, | Performed by: NURSE PRACTITIONER

## 2018-01-13 PROCEDURE — 81003 URINALYSIS AUTO W/O SCOPE: CPT | Mod: QW,S$GLB,, | Performed by: NURSE PRACTITIONER

## 2018-01-13 RX ORDER — NYSTATIN 100000 [USP'U]/ML
6 SUSPENSION ORAL 4 TIMES DAILY
Qty: 240 ML | Refills: 0 | Status: SHIPPED | OUTPATIENT
Start: 2018-01-13 | End: 2018-01-23

## 2018-01-13 NOTE — PATIENT INSTRUCTIONS
Referral place for internal medicine to establish care.      Follow up with your doctor in a few days as needed.  Return to the urgent care or go to the ER if symptoms get worse.    Please return here or go to the Emergency Department for any concerns or worsening of condition.  If you were prescribed antibiotics, please take them to completion.  If you were prescribed a narcotic medication, do not drive or operate heavy equipment or machinery while taking these medications.  Please follow up with your primary care doctor or specialist as needed.    If you  smoke, please stop smoking.        Allergic Rhinitis  Allergic rhinitis is an allergic reaction that affects the nose, and often the eyes. Its often known as nasal allergies. Nasal allergies are often due to things in the environment that are breathed in. Depending what you are sensitive to, nasal allergies may occur only during certain seasons. Or they may occur year round. Common indoor allergens include house dust mites, mold, cockroaches, and pet dander. Outdoor allergens include pollen from trees, grasses, and weeds.   Symptoms include a drippy, stuffy, and itchy nose. They also include sneezing and red and itchy eyes. You may feel tired more often. Severe allergies may also affect your breathing and trigger a condition called asthma.   Tests can be done to see what allergens are affecting you. You may be referred to an allergy specialist for testing and further evaluation.  Home care  Your healthcare provider may prescribe medicines to help relieve allergy symptoms. These may include oral medicines, nasal sprays, or eye drops.  Ask your provider for advice on how to avoid substances that you are allergic to. Below are a few tips for each type of allergen.  Pet dander:  · Do not have pets with fur and feathers.  · If you can't avoid having a pet, keep it out of your bedroom and off upholstered furniture.  Pollen:  · When pollen counts are high, keep windows  of your car and home closed. If possible, use an air conditioner instead.  · Wear a filter mask when mowing or doing yard work.  House dust mites:  · Wash bedding every week in warm water and detergent and dry on a hot setting.  · Cover the mattress, box spring, and pillows with allergy covers.   · If possible, sleep in a room with no carpet, curtains, or upholstered furniture.  Cockroaches:  · Store food in sealed containers.  · Remove garbage from the home promptly.  · Fix water leaks  Mold:  · Keep humidity low by using a dehumidifier or air conditioner. Keep the dehumidifier and air conditioner clean and free of mold.  · Clean moldy areas with bleach and water.  In general:  · Vacuum once or twice a week. If possible, use a vacuum with a high-efficiency particulate air (HEPA) filter.  · Do not smoke. Avoid cigarette smoke. Cigarette smoke is an irritant that can make symptoms worse.  Follow-up care  Follow up as advised by the healthcare provider or our staff. If you were referred to an allergy specialist, make this appointment promptly.  When to seek medical advice  Call your healthcare provider right away if the following occur:  · Coughing or wheezing  · Fever greater than 100.4°F (38°C)  · Hives (raised red bumps)  · Continuing symptoms, new symptoms, or worsening symptoms  Call 911 right away if you have:  · Trouble breathing  · Severe swelling of the face or severe itching of the eyes or mouth  Date Last Reviewed: 3/1/2017  © 1711-6962 [employer]. [address] [place] [address], [place] [address]. All rights reserved. This information is not intended as a substitute for professional medical care. Always follow your healthcare professional's instructions.

## 2018-01-13 NOTE — PROGRESS NOTES
"Subjective:       Patient ID: Davey Al is a 63 y.o. male.    Vitals:  height is 5' 6" (1.676 m) and weight is 52.2 kg (115 lb). His temperature is 99.9 °F (37.7 °C). His blood pressure is 104/78 and his pulse is 74. His respiration is 18 and oxygen saturation is 98%.     Chief Complaint: Sinusitis and Dysuria    Has ALS. Follows with neurology. Was instructed to get labs drawn for visual  hallucinations yesterday. Has not had labs, CXR, or UA drawn.   Reports seeing spiders crawling on the walls at home.   He presents today to  c/o  "sinus infection and UTI"   He presents with his daughter who cares for him. He recently moved here from [place].     Daughter reports foul smelling and cloudy urine. Patient is having a hard time giving a specimen in clinic today. However denies dysuria or s/s of prostatitis.   He presents in wheelchair on nasal CPAP.   Reports thrush to tongue that did not respond to treatment. Denies recent antibiotics or steroids.       Sinusitis   This is a new problem. The current episode started 1 to 4 weeks ago. The problem has been gradually worsening since onset. There has been no fever. Associated symptoms include congestion, coughing and a sore throat. Pertinent negatives include no chills, ear pain, headaches, hoarse voice or shortness of breath.   Dysuria    This is a new problem. The current episode started more than 1 month ago. The patient is experiencing no pain. There has been no fever. Pertinent negatives include no chills, hematuria, nausea, urgency, vomiting or rash.     Review of Systems   Constitution: Negative for chills, fever and malaise/fatigue.   HENT: Positive for congestion and sore throat. Negative for ear pain and hoarse voice.    Eyes: Negative for discharge and redness.   Cardiovascular: Negative for chest pain, dyspnea on exertion and leg swelling.   Respiratory: Positive for cough. Negative for shortness of breath, sputum production and wheezing.    Skin: Negative for " flushing and rash.   Musculoskeletal: Negative for back pain and myalgias.   Gastrointestinal: Negative for abdominal pain, nausea and vomiting.   Genitourinary: Negative for dysuria, genital sores, hematuria and urgency.        Cloudy urine    Neurological: Negative for headaches.       Objective:      Physical Exam   Constitutional: He is oriented to person, place, and time. He appears well-developed. He is cooperative.  Non-toxic appearance. He does not appear ill. No distress.   Wheelchair bound    HENT:   Head: Normocephalic and atraumatic.   Right Ear: Hearing, tympanic membrane, external ear and ear canal normal.   Left Ear: Hearing, tympanic membrane, external ear and ear canal normal.   Nose: Nose normal. No mucosal edema, rhinorrhea or nasal deformity. No epistaxis. Right sinus exhibits no maxillary sinus tenderness and no frontal sinus tenderness. Left sinus exhibits no maxillary sinus tenderness and no frontal sinus tenderness.   Mouth/Throat: Uvula is midline, oropharynx is clear and moist and mucous membranes are normal. No trismus in the jaw. Normal dentition. No uvula swelling. No posterior oropharyngeal erythema. No tonsillar exudate.   + increased oral secretions.   + oral thrush to tongue.    Eyes: Conjunctivae and lids are normal. Pupils are equal, round, and reactive to light. No scleral icterus.   Sclera clear bilat   Neck: Trachea normal, normal range of motion, full passive range of motion without pain and phonation normal. Neck supple.   Cardiovascular: Normal rate, regular rhythm, normal heart sounds, intact distal pulses and normal pulses.    Pulmonary/Chest: Effort normal. No respiratory distress. He has decreased breath sounds.   Nasal cpap   Shallow respirations.    Abdominal: Soft. Normal appearance and bowel sounds are normal. He exhibits no distension. There is no tenderness.   Musculoskeletal: Normal range of motion. He exhibits no edema or deformity.   Neurological: He is alert and  oriented to person, place, and time. He exhibits abnormal muscle tone. Coordination abnormal.   Wheelchair bound.   ALS    Skin: Skin is warm, dry and intact. He is not diaphoretic. No pallor.   Psychiatric: He has a normal mood and affect. His speech is normal and behavior is normal. Judgment and thought content normal. Cognition and memory are normal.   Nursing note and vitals reviewed.        A Chest X-Ray was ordered. Reviewed by myself and Dr. EDUARDO Jones. No consolidation or pneumonia.   Noted air in bowel.     Read by radiologist as: No acute intrathoracic process.  Nonspecific distention of the bowel loops.      Results for orders placed or performed in visit on 01/13/18   POCT Urinalysis, Dipstick, Automated, W/O Scope   Result Value Ref Range    POC Blood, Urine Negative Negative    POC Bilirubin, Urine Negative Negative    POC Urobilinogen, Urine norm 0.3 - 2.2    POC Ketones, Urine Negative Negative    POC Protein, Urine Negative Negative    POC Nitrates, Urine Negative Negative    POC Glucose, Urine Negative Negative    pH, UA 6.5 5 - 8    POC Specific Gravity, Urine 1.015 1.003 - 1.029    POC Leukocytes, Urine Negative Negative           Assessment:       1. Visual hallucinations    2. Thrush, oral    3. Allergic rhinitis, unspecified chronicity, unspecified seasonality, unspecified trigger        Plan:         Visual hallucinations  -     POCT Urinalysis, Dipstick, Automated, W/O Scope  -     X-Ray Chest PA And Lateral; Future; Expected date: 01/13/2018  -     Ambulatory Referral to Internal Medicine    Thrush, oral  -     nystatin (MYCOSTATIN) 100,000 unit/mL suspension; Take 6 mLs (600,000 Units total) by mouth 4 (four) times daily.  Dispense: 240 mL; Refill: 0    Allergic rhinitis, unspecified chronicity, unspecified seasonality, unspecified trigger       Change to Allegra or Zyrtec from Claritin.   Referral placed to internal medicine to establish care. Number provided.   Denies dysuria. No UTI or  pneumonia.   Get labs drawn at main [facility] as his neurologist ordered.    Recently had medication adjustments per neurology.   Discussed this at length with daughter today. Discussed s/s for immediate ER evaluation.     Follow up with your doctor in a few days as needed.  Return to the urgent care or go to the ER if symptoms get worse.    Case reviewed thoroughly with Dr. EDUARDO Jones, who agrees with the above plan of care.

## 2018-02-07 ENCOUNTER — TELEPHONE (OUTPATIENT)
Dept: INTERNAL MEDICINE | Facility: CLINIC | Age: 63
End: 2018-02-07

## 2018-02-07 NOTE — TELEPHONE ENCOUNTER
----- Message from Luisa GAYTAN Sree sent at 2/6/2018  6:42 PM CST -----  Contact: PT Portal Request  Appointment Request From: Davey Al    With Provider: Other - (see comments)    Would Accept With:Only the person I've selected    Preferred Date Range: Any date 2/21/2018 or later    Preferred Times: Any    Reason for visit: Request an Appt    Comments:  Hello,    I need to establish primary care.  Dr. Joseph has requested that I see Dr. Marti Martinez.  Because I have limited movement, it would be helpful if we could get an appointment with her for February 21, the same day as my ALS clinic visit.    Thanks,    Davey

## 2018-02-21 ENCOUNTER — OFFICE VISIT (OUTPATIENT)
Dept: NEUROLOGY | Facility: CLINIC | Age: 63
End: 2018-02-21
Payer: MEDICARE

## 2018-02-21 VITALS — HEART RATE: 76 BPM | SYSTOLIC BLOOD PRESSURE: 134 MMHG | DIASTOLIC BLOOD PRESSURE: 78 MMHG

## 2018-02-21 DIAGNOSIS — M25.512 CHRONIC PAIN OF BOTH SHOULDERS: ICD-10-CM

## 2018-02-21 DIAGNOSIS — Z78.9 IMPAIRED MOBILITY AND ADLS: ICD-10-CM

## 2018-02-21 DIAGNOSIS — G89.29 CHRONIC PAIN OF BOTH SHOULDERS: ICD-10-CM

## 2018-02-21 DIAGNOSIS — M25.511 CHRONIC PAIN OF BOTH SHOULDERS: ICD-10-CM

## 2018-02-21 DIAGNOSIS — G12.21 ALS (AMYOTROPHIC LATERAL SCLEROSIS): Primary | ICD-10-CM

## 2018-02-21 DIAGNOSIS — Z74.09 IMPAIRED MOBILITY AND ADLS: ICD-10-CM

## 2018-02-21 DIAGNOSIS — R13.12 OROPHARYNGEAL DYSPHAGIA: ICD-10-CM

## 2018-02-21 DIAGNOSIS — R47.1 DYSARTHRIA: ICD-10-CM

## 2018-02-21 PROCEDURE — G8999 MOTOR SPEECH CURRENT STATUS: HCPCS | Mod: CI,PO

## 2018-02-21 PROCEDURE — G9186 MOTOR SPEECH GOAL STATUS: HCPCS | Mod: CI,PO

## 2018-02-21 PROCEDURE — 92522 EVALUATE SPEECH PRODUCTION: CPT | Mod: PO

## 2018-02-21 PROCEDURE — 99213 OFFICE O/P EST LOW 20 MIN: CPT | Mod: PBBFAC

## 2018-02-21 PROCEDURE — 92610 EVALUATE SWALLOWING FUNCTION: CPT | Mod: PO

## 2018-02-21 PROCEDURE — 99203 OFFICE O/P NEW LOW 30 MIN: CPT | Mod: S$PBB,,, | Performed by: INTERNAL MEDICINE

## 2018-02-21 PROCEDURE — G8998 SWALLOW D/C STATUS: HCPCS | Mod: CK,PO

## 2018-02-21 PROCEDURE — 99215 OFFICE O/P EST HI 40 MIN: CPT | Mod: S$PBB,,, | Performed by: PSYCHIATRY & NEUROLOGY

## 2018-02-21 PROCEDURE — 99999 PR PBB SHADOW E&M-EST. PATIENT-LVL III: CPT | Mod: PBBFAC,,,

## 2018-02-21 PROCEDURE — G8997 SWALLOW GOAL STATUS: HCPCS | Mod: CK,PO

## 2018-02-21 PROCEDURE — G8996 SWALLOW CURRENT STATUS: HCPCS | Mod: CK,PO

## 2018-02-21 PROCEDURE — G9158 MOTOR SPEECH D/C STATUS: HCPCS | Mod: CI,PO

## 2018-02-21 PROCEDURE — 99213 OFFICE O/P EST LOW 20 MIN: CPT | Mod: S$PBB,,, | Performed by: PHYSICAL MEDICINE & REHABILITATION

## 2018-02-21 NOTE — PROGRESS NOTES
Subjective:       Patient ID: Davey Al is a 63 y.o. male.    Chief Complaint: No chief complaint on file.    HPI       HISTORY OF PRESENT ILLNESS:  Mr. Al is a 63-year-old black male with ALS who   is coming to the clinic for multidisciplinary care.  His past medical history   is also significant for chronic bilateral shoulder pain.  He has been seen by   the Physical Medicine Service for his pain management.  He was last seen on   11/01/2017.  He was maintained on baclofen and p.r.n. tramadol.  He was on   NSAIDs, which were discontinued due to GI bleed.    The patient is coming today to the clinic for followup.  His bilateral shoulder   pain has been under good control.  It is an intermittent, localized aching pain   in the deltoids.  It is aggravated by range of motion.  His pain is usually   tolerable.  He takes tramadol p.r.n., but only 2-3 times per month.      MS/HN  dd: 02/21/2018 11:29:31 (CST)  td: 02/22/2018 06:24:44 (CST)  Doc ID   #[id number]  Job ID #[id number]    CC:         Review of Systems   HENT: Positive for trouble swallowing.    Eyes: Negative for visual disturbance.   Respiratory: Positive for shortness of breath.    Cardiovascular: Negative for chest pain.   Gastrointestinal: Positive for abdominal pain and constipation. Negative for blood in stool, nausea and vomiting.   Genitourinary: Positive for difficulty urinating.   Musculoskeletal: Positive for arthralgias, back pain and neck pain. Negative for gait problem.   Neurological: Positive for weakness. Negative for dizziness and headaches.   Psychiatric/Behavioral: Positive for sleep disturbance. Negative for behavioral problems.       Objective:      Physical Exam   Constitutional: He appears well-developed and well-nourished. No distress.   Coming to the clinic in a manual wheelchair propelled by family   HENT:   Head: Normocephalic and atraumatic.   Neck: Normal range of motion.   -ve pain.  -ve tenderness.   Musculoskeletal:    BUE:  Severe muscle atrophy of shoulders.  Strength:    RUE: 0/5 at shoulder abduction, 0 elbow flexion, 0 elbow extension, 0 hand .   LUE: 0/5 at shoulder abduction, 0 elbow flexion, 0 elbow extension, 0 hand .  Sensation to pinprick:   RUE: intact.   LUE: intact.    BLE:  ROM:full.  Strength:    RLE: 0/5 at hip flexion, 0 knee extension, 0 ankle DF, 0 PF.   LLE: 0/5 at hip flexion, 0 knee extension, 0 ankle DF, 0 PF.  Sensation to pinprick:     RLE: intact.      LLE: intact.        Neurological: He is alert.   Psychiatric: He has a normal mood and affect. His behavior is normal.   Vitals reviewed.      Assessment:       1. ALS (amyotrophic lateral sclerosis)    2. Impaired mobility and ADLs    3. Dysarthria    4. Oropharyngeal dysphagia    5. Chronic pain of both shoulders        Plan:       - Continue baclofen (LIORESAL) 10 MG tablet; Take 1 tablet (10 mg total) by mouth 3 (three) times daily as needed (muscle spasms).  - Continue tramadol (ULTRAM) 50 mg tablet; Take 1 tablet (50 mg total) by mouth once daily as needed for Pain.  - Passive ROM exercises of BUE, especially shoulders, were recommended.  - Follow up in ALS clinic.

## 2018-02-21 NOTE — PROGRESS NOTES
Date: 02/21/2018     Start Time:  8:49  Stop Time:  9:13    ALS Clinic: 6    OUTPATIENT NEUROLOGICAL REHABILITATION  SPEECH THERAPY EVALUATION               ALS MULTIDISCIPLINARY CLINIC    HISTORY AND SUBJECTIVE    Onset Date:  2012  Primary Diagnosis:  ALS  Treatment Diagnosis:  Mild dysarthria and mild dysphagia   Past Medical History:   Past Medical History:   Diagnosis Date    Coronary artery disease     Hypertension      History of Present Illness:  Mr. Al presents to the [facility] clinic secondary to the diagnosis of ALS. His daughter, Nevaeh, was present during the evaluation. Mr. Al became emotional toward the end of the session.   Functional Deficits Leading to Referral/Nature of Injury: Progressive muscle weakness   Precautions:  General swallowing  Prior Therapy:  Home health speech therapy in this past Fall.   Pain:  None reported  Nutrition: Mechanical soft diet consistency and thin liquids, decreased oral intake and appetite, refused Peg tube  Environmental Concerns/Cultural/Spiritual/Developmental/Educational Needs: None  Social History:  Mr. Al now lives in [place] with his daughter, Nevaeh, who is his primary caregiver. He is a retired .  He also has one other adult daughter. He has 24 hour supervision.  Chief Complaint: decreased oral intake    OBJECTIVE  Current Assessment:   Auditory Comprehension: Did not formally assess, no concerns reported or observed.    Reading Comprehension: Did not formally assess today, no concerns were reported.     Verbal Expression: Appeared to be within functional limits in conversational speech during the evaluation.     Written Expression: Did not formally assess. Mr. Al no longer has any movement in his upper extremities.     Cognition: Did not formally assess. No concerns were reported.     Motor Speech/Fluency/Voice: Oral mechanism exam revealed: decreased labial, lingual and velar muscle strength range of  motion. Facial and lingual fasiculations were noted. Mr. Al had full upper and lower dentures, however his lower denture was not secured. He usually only eats with dentures and takes the bottom dentures out the rest of the day. He stated that the dentures have caused increased drooling.  He is now using a Trilogy machine with a nasal mask all of the time and is Vent dependent. Vocal intensity was reduced and decreased breath-speech coordination was noted when speaking. Vocal quality was nasal.  Overall speech intelligibility was fair in a quiet environment and with careful listening. His daughter reported being able to understand him all of the time. Fluency was within functional limits.    Augmentative/Alternative Communication:  Low-tech communication boards and assisted scanning were discussed and a picture communication board was provided at last clinic. Speech is still functional and intelligible at this point per patient and family report.    Swallowing: Mr. Al and his daughter reported that he has decreased appetite. Reportedly he swallows a lot of air with Trilogy machine mask on during oral intake. He is on a soft diet consistency (finely chopped) with thin liquids. He drinks thin liquids from a straw because he is unable to drink unassisted. Mr. Al reported that he is having to use multiple swallows due to an increase in saliva production. Clinical Swallow Study:   A clinical swallow study was performed to assess functionality of the pt's swallow. Mr. Al was presented with:  Presentation S/s of aspiration   Thin liquid via straw  No   Tsp puree N/a   Solid - pineapple pieces No     Oral Phase: Mr. Al had decreased labial seal around a straw and a spoon. Anterior loss was observed during liquid and solid consistencies due to air pressure from mask. Mastication was slow and reduced by observation and patient report. Liquid wash was needed for him to swallow pieces of  pineapple.  Pharyngeal Phase: Significantly reduced laryngeal elevation and excursion was felt via palpation. Multiple audible swallows with some delay were present on all consistencies. Effortful swallow observed for solids.  Vocal quality was clear incosistently but it was difficult to tell with the Trilogy machine on. No coughing or throat clearing were observed during this evaluation. However silent aspiration could not be ruled out and he is at risk for aspiration based on current condition.     Hearing: Appeared to be within functional limits in a quiet environment.    Education: Mr. Al and his daughter were educated on swallowing precautions, diet consistency, compensatory strategies, low-tech communication boards, and the plan of care. They verbalized understanding and agreed with the plan of care.    ASSESSMENT  Impressions:  Mr. Al presented with a dysarthria and oropharyngeal dysphagia as a result of his diagnosis of ALS. His deficits are characterized by oral motor weakness, decreased vocal intensity and reduced breath-speech coordination. Speech intelligibility was fair with careful listening and his daughter reported that she understands him all of the time in a quiet environment. His dysphagia was characterized by decreased labial seal, reduced mastication, decreased laryngeal elevation, multiple, audible swallows, and inconsistent wet vocal quality. Continue very finely chopped to pureed food diet and thin liquids as tolerated for quality of life. Mr. Al declined a peg tube for supplemental nutrition.     Functional Communication Measure (FCM):   Severity Modifier for Medicare G-Code:  Motor Speech  Current status: FCM:  Level 6   - CI at least 1% but less than 20% impaired, limited or restricted   Projected status:  FCM:  Level 6    -  CI at least 1% but less than 20% impaired, limited or restricted   Discharge status:  FCM:  Level 6   -  CI at least 1% but less than 20%  impaired, limited or restricted     Swallowing  Current status:  FCM:  Level 4   - CK  at least 40% < 60% impaired, limited or restricted  Projected status:  FCM: Level 4     - CK at least 40% < 60% impaired, limited or restricted  Discharge status:  FCM:   Level 4   -  CK at least 40% < 60% impaired, limited or restricted      PLAN  Recommended Treatment Plan:  1. Mr. Al will follow up with the [facility] in three months.  2. No further speech therapy services.     GERRY Chambers, CCC-SLP  Speech-Language Pathologist  Date: 02/21/2018

## 2018-02-21 NOTE — PROGRESS NOTES
MNT Follow-up:    Pt & his daughter present for today's encounter in ALS Clinic.  Pt continues on soft diet with finely chopped/mashed foods as tolerated. Pt wears dentures when eating.   Pt drinks two containers Ensure Enlive po supplement (350 calories & 20 gm protein/container) daily.  Pt emphatically stated he is not interested in feeding tube.  Today pt states he is feeling better and stronger than he has on previous clinic visits.    Today's wt:  52.2 kg (115 lbs)  BMI:  18.56  -  Pt does appear to be very thin    Neither pt nor his daughter voiced any nutrition-related questions/concerns today.  Will continue to follow according to ALS protocol.

## 2018-02-21 NOTE — PROGRESS NOTES
CHIEF COMPLAINT:  No chief complaint on file.    HISTORY OF PRESENT ILLNESS: Davey Al is a 63 y.o. male  [place] who presents with his daughter Nevaeh ([place]) for evaluation for ALS.  He has a 25 year history of tobacco smoking (q 52 yo), but was in good health biking 5 miles to work as  and playing golf on weekends. 2011 right hand cramping, three years later left hand involved. Still walking but only with assistance due to flail arm presentation. Using elevate HOB, sleep is frequently interrupted, no sialorrhea, uses voice but not hands to communicate. Eating with some difficulty and does not remove nasal mask (AVAPS), no changes in speech.  No antibiotics, chest infection or abx since last visit.    Sinus congestion remains major concern- on daily saline, antihistamine (benadryl at night and prn), decongestant (pseudoephedrine tid), mucinex liquid sinus max.  Continuous NIV.  Uses nasal mask- not FM as needs to speak.  Saliva control better with GP tid.    PAST MEDICAL HISTORY:    Past Medical History:   Diagnosis Date    Coronary artery disease     Hypertension        PAST SURGICAL HISTORY:    Past Surgical History:   Procedure Laterality Date    CARDIAC CATHETERIZATION      4 stents       FAMILY HISTORY:                No family history on file.  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 90 tablet 3    amLODIPine (NORVASC) 5 MG tablet TAKE 1 TABLET BY MOUTH EVERY DAY 90 tablet 3    aspirin (ECOTRIN) 81 MG EC tablet  Take 81 mg by mouth.      baclofen (LIORESAL) 10 MG tablet Take 1 tablet (10 mg total) by mouth 3 (three) times daily as needed (muscle spasms). 90 tablet 3    cetirizine (ZYRTEC) 1 mg/mL syrup Take 10 mLs (10 mg total) by mouth once daily. 300 mL 11    cyanocobalamin, vitamin B-12, 1,000 mcg/mL Kit Inject 1,000 mcg as directed as directed. Inject 1000 mcg twice weekly 8 kit 5    glycopyrrolate (ROBINUL) 1 mg Tab Take 1 tablet (1 mg total) by mouth 3 (three) times daily. 270 tablet 3    lisinopril (PRINIVIL,ZESTRIL) 20 MG tablet TAKE 1 TABLET BY MOUTH EVERY DAY 90 tablet 3    naproxen sodium (ANAPROX) 220 MG tablet Take 1-2 tablets (220-440 mg total) by mouth 2 (two) times daily as needed.      omeprazole (PRILOSEC) 20 MG capsule Take 1 capsule (20 mg total) by mouth once daily. 30 capsule 11    traMADol (ULTRAM) 50 mg tablet Take 1 tablet (50 mg total) by mouth every evening. 30 tablet 3    vitamin E 100 UNIT capsule Take 1,000 Units by mouth once daily.       No current facility-administered medications for this visit.                   REVIEW OF SYSTEMS:   Pulmonary related symptoms as per HPI.  Gen:  no weight loss, no fever, no night sweats  HEENT: + sinus congestion, + dysphagia , no voice changes  CV:  no chest pain, + orthopnea, no paroxysmal nocturnal dyspnea  GI:  no melena, no hematochezia, no diarrhea, no constipation  :  no dysuria, no hematuria, no incontinence  Neuro:  Not ambulatory       PHYSICAL EXAM:   Vitals:    02/21/18 0846   BP: 134/78   Pulse: 76   PainSc: 0-No pain   Resp rate 20  GENERAL:  Alert, calm, in no  distress  HEENT:  Normal pupils, normal conjunctiva, normal EOM, nasal and oral mucosa normal, tongue trace fasciculation, not atrophic  NECK:  supple; no palpable lymphadenopathy or masses,no jugular venous distention.  CVS: regular rate and rhythm, no murmers, gallops or rubs  PULM: Grossly decreased vital capacity, normal to percussion and palpation, clear to auscultation  bilaterally with no wheezes, crackles or rhonchi, +SCM accessory muscle usage w VC, ant chest quiet.  ABDOMEN:  soft nontender/nondistended, normal rise with inspiration-seems active, intact contraction with cough  EXTREMITIES no cyanosis, clubbing or edema.  Atrophic flaccid upper extremities    CONTRIBUTORY STUDIES:     PULMONARY FUNCTION TESTS: not measured      ACTIVE PULMONARY PROBLEMS:    ICD-10-CM ICD-9-CM    1. ALS (amyotrophic lateral sclerosis) G12.21 335.20 Ambulatory referral to ALS Clinic      Ambulatory referral to Physical Therapy      Ambulatory referral to Occupational Therapy      Ambulatory referral to Speech Therapy      Ambulatory referral to Physical Medicine Rehab      Ambulatory referral to Pulmonology      Ambulatory referral to Respiratory Therapy      Ambulatory referral to Social Work   2. Impaired mobility and ADLs Z74.09 799.89 Ambulatory referral to ALS Clinic      Ambulatory referral to Physical Therapy      Ambulatory referral to Occupational Therapy      Ambulatory referral to Speech Therapy      Ambulatory referral to Physical Medicine Rehab      Ambulatory referral to Pulmonology      Ambulatory referral to Respiratory Therapy      Ambulatory referral to Social Work   3. Dysarthria R47.1 784.51 Ambulatory referral to ALS Clinic      Ambulatory referral to Physical Therapy      Ambulatory referral to Occupational Therapy      Ambulatory referral to Speech Therapy      Ambulatory referral to Physical Medicine Rehab      Ambulatory referral to Pulmonology      Ambulatory referral to Respiratory Therapy      Ambulatory referral to Social Work   4. Oropharyngeal dysphagia R13.12 787.22 Ambulatory referral to ALS Clinic      Ambulatory referral to Physical Therapy      Ambulatory referral to Occupational Therapy      Ambulatory referral to Speech Therapy      Ambulatory referral to Physical Medicine Rehab      Ambulatory referral to Pulmonology      Ambulatory referral to Respiratory  Therapy      Ambulatory referral to Social Work   5. Chronic pain of both shoulders M25.511 719.41     G89.29 338.29     M25.512         ASSESSMENT&PLAN:  1.  Chronic neuromuscular respiratory failure- on continuous NIV (AVAPS PS 10-25)  EPAP 4  2.  Cough efficacy diminished- cough assist available, not using consistently.  Uses suction machine PRN  3.  Does not wish intubation/trach/cpr- completed [facility] - accepts [facility] information visit -  Need to reschedule  4.  Sinus congestion - cont rx  5.  Dysphagia- had declined gastrostomy tube.       No Follow-up on file.      [Greater than 50% of this 25 minute visit spent counseling patient and family]

## 2018-02-21 NOTE — PROGRESS NOTES
Subjective:       Patient ID: Davey Al is a 63 y.o. male.    Chief Complaint:  SLP Initial Evaluation      History of Present Illness  HPI  FVC 10% FRS: 11, July 2017  Continuous NIV, currently  Needs PCP  +constipation - MOM helps    Review of Systems  Review of Systems   Constitutional: Positive for activity change, appetite change, fatigue and unexpected weight change.   HENT: Positive for congestion, trouble swallowing and voice change.    Eyes: Negative for discharge and itching.   Respiratory: Positive for choking and shortness of breath.    Cardiovascular: Negative for chest pain and leg swelling.   Gastrointestinal: Positive for constipation. Negative for abdominal distention, abdominal pain and vomiting.   Endocrine: Negative for cold intolerance and heat intolerance.   Genitourinary: Negative for difficulty urinating and dysuria.   Musculoskeletal: Positive for gait problem. Negative for back pain, myalgias, neck pain and neck stiffness.   Skin: Negative for color change and pallor.   Neurological: Positive for speech difficulty and weakness.   Psychiatric/Behavioral: Negative for dysphoric mood. The patient is not nervous/anxious.        Objective:   /78   Pulse 76      Neurologic Exam    Physical Exam  Patient unable to activate limbs.  ALS Physical Exam PBA Speech Facial Strength Tongue Strength Tongue Appear GPS Jaw Jerk   7/26/2017 No mild mild normal fasic No No      ALS Physical Exam (Continued) Limb Fasciculations Neck Flex HHD Neck Flex MMT Neck Ext HHD Neck Ext MMT Shd Abd R HHD Shd Abd R MMT   7/26/2017 Absent - - - - - -      ALS Physical Exam (Continued) Shd Abd L HHD Shd Abd L MMT Wrist Ext R HHD Wrist Ext R MMT Wrist Ext L HHD Wrist Ext L MMT Hand  R (kg)   7/26/2017 - - - - - - -      ALS Physical Exam (Continued) Hand  L (kg) Hip Flex R HHD Hip Flex R MMT Hip Flex L HHD Hip Flex L MMT Knee Ext R HHD Knee Ext R MMT   7/26/2017 - - - - - - -      ALS Physical Exam  (Continued) Knee Ext L HHD Knee Ext L MMT Foot DF R HHD Foot DF R MMT Foot DF L HHD Foot DF L MMT UMN Signs Legs   7/26/2017 - - - - - - No                    Impression and Plan:     Problem List Items Addressed This Visit     ALS (amyotrophic lateral sclerosis) - Primary    Overview     Onset 2011, right hand  Continuous NIV  Does not wish intubation/trach/cpr- completed [facility]         Current Assessment & Plan     Reschedule [facility] Visit  RTC 3 mos         Relevant Orders    Ambulatory referral to ALS Clinic    Ambulatory referral to Physical Therapy    Ambulatory referral to Occupational Therapy    Ambulatory referral to Speech Therapy    Ambulatory referral to Physical Medicine Rehab    Ambulatory referral to Pulmonology    Ambulatory referral to Respiratory Therapy    Ambulatory referral to Social Work    Ambulatory Referral to Hospice    Impaired mobility and ADLs    Current Assessment & Plan     Declined PT/OT assessment today         Relevant Orders    Ambulatory referral to ALS Clinic    Ambulatory referral to Physical Therapy    Ambulatory referral to Occupational Therapy    Ambulatory referral to Speech Therapy    Ambulatory referral to Physical Medicine Rehab    Ambulatory referral to Pulmonology    Ambulatory referral to Respiratory Therapy    Ambulatory referral to Social Work    Dysarthria    Current Assessment & Plan     See speech note         Relevant Orders    Ambulatory referral to ALS Clinic    Ambulatory referral to Physical Therapy    Ambulatory referral to Occupational Therapy    Ambulatory referral to Speech Therapy    Ambulatory referral to Physical Medicine Rehab    Ambulatory referral to Pulmonology    Ambulatory referral to Respiratory Therapy    Ambulatory referral to Social Work    Oropharyngeal dysphagia    Current Assessment & Plan     See speech note         Relevant Orders    Ambulatory referral to ALS Clinic    Ambulatory referral to Physical Therapy    Ambulatory  referral to Occupational Therapy    Ambulatory referral to Speech Therapy    Ambulatory referral to Physical Medicine Rehab    Ambulatory referral to Pulmonology    Ambulatory referral to Respiratory Therapy    Ambulatory referral to Social Work      Other Visit Diagnoses     Chronic pain of both shoulders

## 2018-03-06 RX ORDER — AMITRIPTYLINE HYDROCHLORIDE 25 MG/1
25 TABLET, FILM COATED ORAL NIGHTLY PRN
Qty: 90 TABLET | Refills: 3 | Status: SHIPPED | OUTPATIENT
Start: 2018-03-06 | End: 2019-03-06

## 2018-03-12 ENCOUNTER — TELEPHONE (OUTPATIENT)
Dept: CRITICAL CARE MEDICINE | Facility: HOSPITAL | Age: 63
End: 2018-03-12

## 2018-03-12 RX ORDER — FLUCONAZOLE 150 MG/1
150 TABLET ORAL DAILY
Qty: 1 TABLET | Refills: 0 | Status: SHIPPED | OUTPATIENT
Start: 2018-03-12 | End: 2018-03-13

## 2018-03-16 RX ORDER — FLUCONAZOLE 150 MG/1
TABLET ORAL
Qty: 1 TABLET | Refills: 0 | Status: SHIPPED | OUTPATIENT
Start: 2018-03-16 | End: 2018-03-19 | Stop reason: SDUPTHER

## 2018-03-19 DIAGNOSIS — G12.21 ALS (AMYOTROPHIC LATERAL SCLEROSIS): ICD-10-CM

## 2018-03-19 DIAGNOSIS — B37.0 ORAL THRUSH: Primary | ICD-10-CM

## 2018-03-19 RX ORDER — FLUCONAZOLE 150 MG/1
150 TABLET ORAL DAILY
Qty: 2 TABLET | Refills: 3 | Status: SHIPPED | OUTPATIENT
Start: 2018-03-19

## 2018-04-02 ENCOUNTER — TELEPHONE (OUTPATIENT)
Dept: NEUROLOGY | Facility: CLINIC | Age: 63
End: 2018-04-02

## 2018-04-02 NOTE — TELEPHONE ENCOUNTER
Daughter called to request sleep aid for patient. She has tried OTC Benadryl and Nyquil.     Also, oral thrush returned after completing Nystatin and Diflucan presciptions. Daughter states that patient is requesting she scrubs the affected area several times a day; however, patient does not get any relief. RN Coordinator recommended patient to go to urgent care for evaluation of oral thrush.     Nevaeh is also requesting a follow re: [facility] PCP.

## 2018-04-03 DIAGNOSIS — F41.9 ANXIETY: Primary | ICD-10-CM

## 2018-04-03 RX ORDER — LORAZEPAM 0.5 MG/1
0.5 TABLET ORAL EVERY 8 HOURS PRN
Qty: 270 TABLET | Refills: 1 | Status: SHIPPED | OUTPATIENT
Start: 2018-04-03 | End: 2018-05-03

## 2019-05-11 NOTE — PROGRESS NOTES
Auntie given MPD number working on calling at this time.    Name: Davey Al  MRN: [id number]   Physician: Jake    Diagnosis:   Encounter Diagnoses   Name Primary?    Impaired mobility and ADLs Yes    Weakness of both upper extremities     Chronic left shoulder pain     ALS (amyotrophic lateral sclerosis)     Clinic 4    Onset date: 5 years    Orders: Eval and Treat    Subjective:  Patient goals: Comfort in power w/c,   Chief Complaint:   Chief Complaint   Patient presents with    OT Progress Note      Precautions: safety   Social Hx: disabled,  Lives with a friend    DME: PWC with chin control, Adjustable Bed, B hand splints, BSC, Tub bench    Home access: 2 steps, Ramp  Past treatment includes: PT  OP in the past.     Precautions:fall,   Pain:8/10 at L Big toe in bed with sheet resting on foot, general soreness with passive movement. Chest and back pain.  Pain established using the Numbers pain scale.     Dominant hand: right    Occupation/hobbies/homemaking: D, in bed a lot, pain in chest when upright  Job description includes: na  Driving status: na    Objective  Cognitive Exam  Oriented: Person, Place, Time and Situation  Behaviors: tearful  Follows Commands/attention: Follows multistep  commands  Communication: clear/fluent  Memory: No Deficits noted  Safety awareness/insight to disability: fair,   Coping skills/emotional control: Tearful      Physical Exam:    Postural examination/scapula alignment: rounded B   Joint integrity:  subluxed shoulders x 2 fingers.  Skin integrity: WNl  Edema: none    Palpation: no palpable tenderness    Sensation: Davey reports normal sensation.  No AROM in BUE  PROM WFL.     Tone:  Modified Ryan Scale:   0 - No increase in muscle tone      Balance:   Static Sit Poor  Dynamic sit Poor    Functional Status:                            Functional Mobility:  Bed mobility: D  Roll to left: D  Roll to right: D  Supine to sit: D  Sit to supine: D  Transfers to bed: mod a  Transfers to toilet: mod a  Car transfers:mod a  Wheelchair  mobility: using chin control power w/c, going up ramp is difficult but going down and on flat surfaces is working well.     ADL's:  Feeding: D  Grooming: D  Hygiene: D  UB Dressing: D  LB Dressing: D  Toileting: D  Bathing: D    IADL's:  Homecare: D  Cooking: D  Laundry: D  Yard work: D  Use of telephone: mod a  Money management: D  Medication management: D      TODAY'S TREATMENT  Re-eval for needs. Reviewed importance of position change every 15 minutes in PWC or lift chair and every 30 minutes in bed.     ASSESSMENT:  OT diagnosis: Impaired mobility and ADL, subluxed shoulders with pain.     Assessment  Davey Al is a 62 y.o. male with a medical diagnosis of   Encounter Diagnoses   Name Primary?    Impaired mobility and ADLs Yes    Weakness of both upper extremities     Chronic left shoulder pain     ALS (amyotrophic lateral sclerosis)     referred to occupational therapy for eval and tx.       PLAN:  No Current needs from OT. Pt. On gel cushion in chair. Pt. Has adjustable bed but not hospital bed. Will talk to Dr. Joseph about hospital bed with air mattress but no skin breakdown only discomfort.